# Patient Record
Sex: MALE | Race: BLACK OR AFRICAN AMERICAN | NOT HISPANIC OR LATINO | Employment: FULL TIME | URBAN - METROPOLITAN AREA
[De-identification: names, ages, dates, MRNs, and addresses within clinical notes are randomized per-mention and may not be internally consistent; named-entity substitution may affect disease eponyms.]

---

## 2023-04-24 ENCOUNTER — TELEPHONE (OUTPATIENT)
Dept: PRIMARY CARE | Facility: CLINIC | Age: 63
End: 2023-04-24
Payer: COMMERCIAL

## 2023-04-24 DIAGNOSIS — Z12.5 SCREENING PSA (PROSTATE SPECIFIC ANTIGEN): Primary | ICD-10-CM

## 2023-04-24 DIAGNOSIS — E78.5 HYPERLIPIDEMIA, UNSPECIFIED HYPERLIPIDEMIA TYPE: ICD-10-CM

## 2023-04-24 DIAGNOSIS — Z00.00 WELLNESS EXAMINATION: ICD-10-CM

## 2023-04-24 NOTE — TELEPHONE ENCOUNTER
----- Message from Vira Bailey sent at 4/24/2023  2:16 PM EDT -----  Regarding: request for labs  Patient called to request labs before his appointment on 05/01/23.

## 2023-04-25 ENCOUNTER — TELEPHONE (OUTPATIENT)
Dept: PRIMARY CARE | Facility: CLINIC | Age: 63
End: 2023-04-25

## 2023-04-25 NOTE — TELEPHONE ENCOUNTER
----- Message from Vicky Rivers DO sent at 4/24/2023  5:25 PM EDT -----  Regarding: RE: request for labs  Labs ordered  They are fasting  Let him know pols  ----- Message -----  From: Vira Bailey  Sent: 4/24/2023   2:18 PM EDT  To: Vicky Rivers DO  Subject: request for labs                                 Patient called to request labs before his appointment on 05/01/23.

## 2023-04-28 ENCOUNTER — LAB (OUTPATIENT)
Dept: LAB | Facility: LAB | Age: 63
End: 2023-04-28
Payer: COMMERCIAL

## 2023-04-28 DIAGNOSIS — E78.5 HYPERLIPIDEMIA, UNSPECIFIED HYPERLIPIDEMIA TYPE: ICD-10-CM

## 2023-04-28 DIAGNOSIS — Z00.00 WELLNESS EXAMINATION: ICD-10-CM

## 2023-04-28 DIAGNOSIS — Z12.5 SCREENING PSA (PROSTATE SPECIFIC ANTIGEN): ICD-10-CM

## 2023-04-28 LAB
ALANINE AMINOTRANSFERASE (SGPT) (U/L) IN SER/PLAS: 9 U/L (ref 10–52)
ALBUMIN (G/DL) IN SER/PLAS: 4.5 G/DL (ref 3.4–5)
ALKALINE PHOSPHATASE (U/L) IN SER/PLAS: 105 U/L (ref 33–136)
ANION GAP IN SER/PLAS: 11 MMOL/L (ref 10–20)
ASPARTATE AMINOTRANSFERASE (SGOT) (U/L) IN SER/PLAS: 18 U/L (ref 9–39)
BILIRUBIN TOTAL (MG/DL) IN SER/PLAS: 0.8 MG/DL (ref 0–1.2)
CALCIUM (MG/DL) IN SER/PLAS: 10 MG/DL (ref 8.6–10.6)
CARBON DIOXIDE, TOTAL (MMOL/L) IN SER/PLAS: 29 MMOL/L (ref 21–32)
CHLORIDE (MMOL/L) IN SER/PLAS: 107 MMOL/L (ref 98–107)
CHOLESTEROL (MG/DL) IN SER/PLAS: 222 MG/DL (ref 0–199)
CHOLESTEROL IN HDL (MG/DL) IN SER/PLAS: 54 MG/DL
CHOLESTEROL/HDL RATIO: 4.1
CREATININE (MG/DL) IN SER/PLAS: 1.1 MG/DL (ref 0.5–1.3)
GFR MALE: 77 ML/MIN/1.73M2
GLUCOSE (MG/DL) IN SER/PLAS: 86 MG/DL (ref 74–99)
HEPATITIS C VIRUS AB PRESENCE IN SERUM: NONREACTIVE
LDL: 152 MG/DL (ref 0–99)
POTASSIUM (MMOL/L) IN SER/PLAS: 5.1 MMOL/L (ref 3.5–5.3)
PROSTATE SPECIFIC AG (NG/ML) IN SER/PLAS: 0.8 NG/ML (ref 0–4)
PROTEIN TOTAL: 7.6 G/DL (ref 6.4–8.2)
SODIUM (MMOL/L) IN SER/PLAS: 142 MMOL/L (ref 136–145)
TRIGLYCERIDE (MG/DL) IN SER/PLAS: 78 MG/DL (ref 0–149)
UREA NITROGEN (MG/DL) IN SER/PLAS: 14 MG/DL (ref 6–23)
VLDL: 16 MG/DL (ref 0–40)

## 2023-04-28 PROCEDURE — 80053 COMPREHEN METABOLIC PANEL: CPT

## 2023-04-28 PROCEDURE — 86803 HEPATITIS C AB TEST: CPT

## 2023-04-28 PROCEDURE — 84153 ASSAY OF PSA TOTAL: CPT

## 2023-04-28 PROCEDURE — 80061 LIPID PANEL: CPT

## 2023-04-28 PROCEDURE — 36415 COLL VENOUS BLD VENIPUNCTURE: CPT

## 2023-05-01 ENCOUNTER — OFFICE VISIT (OUTPATIENT)
Dept: PRIMARY CARE | Facility: CLINIC | Age: 63
End: 2023-05-01
Payer: COMMERCIAL

## 2023-05-01 VITALS
WEIGHT: 217.8 LBS | OXYGEN SATURATION: 97 % | HEART RATE: 61 BPM | RESPIRATION RATE: 12 BRPM | TEMPERATURE: 97.4 F | HEIGHT: 74 IN | DIASTOLIC BLOOD PRESSURE: 80 MMHG | BODY MASS INDEX: 27.95 KG/M2 | SYSTOLIC BLOOD PRESSURE: 124 MMHG

## 2023-05-01 DIAGNOSIS — I25.10 CORONARY ARTERY DISEASE INVOLVING NATIVE HEART, UNSPECIFIED VESSEL OR LESION TYPE, UNSPECIFIED WHETHER ANGINA PRESENT: Primary | ICD-10-CM

## 2023-05-01 PROBLEM — E66.3 OVERWEIGHT WITH BODY MASS INDEX (BMI) 25.0-29.9: Status: ACTIVE | Noted: 2023-01-27

## 2023-05-01 PROBLEM — F32.A DEPRESSIVE DISORDER: Status: ACTIVE | Noted: 2023-05-01

## 2023-05-01 PROBLEM — S82.009A CLOSED FRACTURE OF PATELLA: Status: ACTIVE | Noted: 2023-05-01

## 2023-05-01 PROBLEM — A60.00 GENITAL HERPES SIMPLEX: Status: ACTIVE | Noted: 2023-05-01

## 2023-05-01 PROBLEM — M19.91 LOCALIZED, PRIMARY OSTEOARTHRITIS: Status: ACTIVE | Noted: 2023-01-27

## 2023-05-01 PROBLEM — E78.5 HYPERLIPEMIA: Status: ACTIVE | Noted: 2023-05-01

## 2023-05-01 PROBLEM — M79.671 RIGHT FOOT PAIN: Status: ACTIVE | Noted: 2023-05-01

## 2023-05-01 PROBLEM — H18.519 CORNEA GUTTATA: Status: ACTIVE | Noted: 2023-05-01

## 2023-05-01 PROBLEM — M21.6X1 PRONATION OF BOTH FEET: Status: ACTIVE | Noted: 2023-05-01

## 2023-05-01 PROBLEM — I05.9 MITRAL VALVE DISORDER: Status: ACTIVE | Noted: 2023-05-01

## 2023-05-01 PROBLEM — D12.6 TUBULAR ADENOMA OF COLON: Status: ACTIVE | Noted: 2023-05-01

## 2023-05-01 PROBLEM — R22.2 MASS IN CHEST: Status: ACTIVE | Noted: 2023-05-01

## 2023-05-01 PROBLEM — G25.81 RESTLESS LEG: Status: ACTIVE | Noted: 2023-05-01

## 2023-05-01 PROBLEM — M40.204 KYPHOSIS OF THORACIC REGION: Status: ACTIVE | Noted: 2023-05-01

## 2023-05-01 PROBLEM — C38.3: Status: RESOLVED | Noted: 2023-05-01 | Resolved: 2023-05-01

## 2023-05-01 PROBLEM — S83.004A CLOSED DISLOCATION OF RIGHT PATELLA: Status: ACTIVE | Noted: 2023-05-01

## 2023-05-01 PROBLEM — R22.1 MASS IN NECK: Status: ACTIVE | Noted: 2023-05-01

## 2023-05-01 PROBLEM — H25.813 COMBINED FORM OF AGE-RELATED CATARACT, BOTH EYES: Status: ACTIVE | Noted: 2023-05-01

## 2023-05-01 PROBLEM — C38.3: Status: ACTIVE | Noted: 2023-05-01

## 2023-05-01 PROBLEM — N52.9 ERECTILE DYSFUNCTION: Status: ACTIVE | Noted: 2023-05-01

## 2023-05-01 PROBLEM — M21.6X2 PRONATION OF BOTH FEET: Status: ACTIVE | Noted: 2023-05-01

## 2023-05-01 PROBLEM — R31.29 MICROSCOPIC HEMATURIA: Status: ACTIVE | Noted: 2023-05-01

## 2023-05-01 PROBLEM — H33.322 RETINAL HOLE OF LEFT EYE: Status: ACTIVE | Noted: 2023-05-01

## 2023-05-01 PROBLEM — N13.8 BENIGN PROSTATIC HYPERPLASIA WITH URINARY OBSTRUCTION: Status: ACTIVE | Noted: 2023-05-01

## 2023-05-01 PROBLEM — M89.9 BONE DISORDER: Status: ACTIVE | Noted: 2023-05-01

## 2023-05-01 PROBLEM — D49.89 MEDIASTINAL TUMOR: Status: ACTIVE | Noted: 2023-05-01

## 2023-05-01 PROBLEM — K21.9 GASTRO-ESOPHAGEAL REFLUX DISEASE WITHOUT ESOPHAGITIS: Status: ACTIVE | Noted: 2023-05-01

## 2023-05-01 PROBLEM — H25.11 AGE-RELATED NUCLEAR CATARACT, RIGHT: Status: ACTIVE | Noted: 2023-05-01

## 2023-05-01 PROBLEM — H52.4 PRESBYOPIA: Status: ACTIVE | Noted: 2023-05-01

## 2023-05-01 PROBLEM — N13.8 BPH WITH OBSTRUCTION/LOWER URINARY TRACT SYMPTOMS: Status: ACTIVE | Noted: 2023-05-01

## 2023-05-01 PROBLEM — S22.000A THORACIC COMPRESSION FRACTURE (MULTI): Status: ACTIVE | Noted: 2023-05-01

## 2023-05-01 PROBLEM — I25.9 ASYMPTOMATIC CORONARY HEART DISEASE: Status: ACTIVE | Noted: 2023-05-01

## 2023-05-01 PROBLEM — M54.50 LOW BACK PAIN: Status: ACTIVE | Noted: 2023-05-01

## 2023-05-01 PROBLEM — H25.12 AGE-RELATED NUCLEAR CATARACT, LEFT: Status: ACTIVE | Noted: 2023-05-01

## 2023-05-01 PROBLEM — E78.5 HYPERLIPIDEMIA: Status: ACTIVE | Noted: 2023-05-01

## 2023-05-01 PROBLEM — M79.671 PAIN OF RIGHT HEEL: Status: ACTIVE | Noted: 2023-05-01

## 2023-05-01 PROBLEM — M17.11 ARTHRITIS OF RIGHT KNEE: Status: ACTIVE | Noted: 2023-05-01

## 2023-05-01 PROBLEM — I34.0 MITRAL VALVE INSUFFICIENCY: Status: ACTIVE | Noted: 2023-05-01

## 2023-05-01 PROBLEM — N40.1 BPH WITH OBSTRUCTION/LOWER URINARY TRACT SYMPTOMS: Status: ACTIVE | Noted: 2023-05-01

## 2023-05-01 PROBLEM — M67.01 SHORT ACHILLES TENDON OF RIGHT LOWER EXTREMITY: Status: ACTIVE | Noted: 2023-05-01

## 2023-05-01 PROBLEM — R45.4 ANGER: Status: ACTIVE | Noted: 2023-05-01

## 2023-05-01 PROBLEM — H35.412 LATTICE DEGENERATION OF PERIPHERAL RETINA, LEFT: Status: ACTIVE | Noted: 2023-05-01

## 2023-05-01 PROBLEM — M72.2 PLANTAR FASCIITIS: Status: ACTIVE | Noted: 2023-05-01

## 2023-05-01 PROBLEM — N40.1 BENIGN PROSTATIC HYPERPLASIA WITH URINARY OBSTRUCTION: Status: ACTIVE | Noted: 2023-05-01

## 2023-05-01 PROBLEM — R59.0 MEDIASTINAL ADENOPATHY: Status: ACTIVE | Noted: 2023-05-01

## 2023-05-01 PROCEDURE — 1036F TOBACCO NON-USER: CPT | Performed by: FAMILY MEDICINE

## 2023-05-01 PROCEDURE — 99213 OFFICE O/P EST LOW 20 MIN: CPT | Performed by: FAMILY MEDICINE

## 2023-05-01 RX ORDER — ATORVASTATIN CALCIUM 40 MG/1
40 TABLET, FILM COATED ORAL DAILY
Qty: 90 TABLET | Refills: 0 | Status: SHIPPED | OUTPATIENT
Start: 2023-05-01 | End: 2024-03-20 | Stop reason: SDUPTHER

## 2023-05-01 RX ORDER — ASPIRIN 81 MG/1
TABLET ORAL
COMMUNITY
Start: 2022-04-15

## 2023-05-01 RX ORDER — SILDENAFIL CITRATE 20 MG/1
TABLET ORAL
COMMUNITY
Start: 2020-09-02 | End: 2024-03-20 | Stop reason: WASHOUT

## 2023-05-01 RX ORDER — ATORVASTATIN CALCIUM 20 MG/1
1 TABLET, FILM COATED ORAL DAILY
COMMUNITY
End: 2023-05-01 | Stop reason: SDUPTHER

## 2023-05-01 RX ORDER — MULTIVIT-MIN/IRON/FOLIC ACID/K 18-600-40
CAPSULE ORAL
COMMUNITY

## 2023-05-01 ASSESSMENT — LIFESTYLE VARIABLES
SKIP TO QUESTIONS 9-10: 1
HOW OFTEN DO YOU HAVE A DRINK CONTAINING ALCOHOL: NEVER
HOW MANY STANDARD DRINKS CONTAINING ALCOHOL DO YOU HAVE ON A TYPICAL DAY: PATIENT DOES NOT DRINK
HOW OFTEN DO YOU HAVE SIX OR MORE DRINKS ON ONE OCCASION: NEVER
AUDIT-C TOTAL SCORE: 0

## 2023-05-01 NOTE — PATIENT INSTRUCTIONS
Start on higher dose of the atorvastatin 40mg  - repeat labs in 3 months  -referral for cardio for clearance    Please follow up in 6months for wellness or as needed.       ** If labs or imaging ordered at today's visit, all the non-urgent results will be discussed at your next visit    If you have been referred for a special test or to a specialist please call  [phone] to schedule an appointment.  If you have any further questions, or if develop new or worsened symptoms, please give our office a call at [phone].

## 2023-05-01 NOTE — PROGRESS NOTES
"Subjective   Patient ID: Rob Quinonez is a 62 y.o. male who presents for Follow-up (6 month ).    HPI       BP at goal today in office.  Using medications without issues.  Denies CP, SOB, palpitations, change in vision, dizziness, N/V.      Review of Systems    All systems reviewed and neg if not noted in the HPI above     Objective   /80   Pulse 61   Temp 36.3 °C (97.4 °F)   Resp 12   Ht 1.88 m (6' 2\")   Wt 98.8 kg (217 lb 12.8 oz)   SpO2 97%   BMI 27.96 kg/m²     Physical Exam    Pleasant  Eyes: conjunctiva non-icteric and eye lids are without obvious rash or drooping. Pupils are symmetric.   Ears, Nose, Mouth, and Throat: External ears and nose appear to be without deformity or rash. No lesions or masses noted. Hearing is grossly intact.   CV: RRR, no murmur  Carotids: no bruits  Pulm:CTA B/L  Abd: soft, NTTP, + BS  LE: no edema  Psychiatric: Alert, orientation to person, place, and time. Recent/remote memory as evidenced through face-to-face interaction and discussion appear grossly intact. Mood and affect are normal.     Assessment/Plan   Problem List Items Addressed This Visit          Circulatory    CAD (coronary artery disease) - Primary    Relevant Medications    sildenafil (Revatio) 20 mg tablet    atorvastatin (Lipitor) 40 mg tablet    Other Relevant Orders    AST    ALT    Lipid Panel    Referral to Cardiology          "

## 2023-05-31 LAB
ANION GAP IN SER/PLAS: 13 MMOL/L (ref 10–20)
BASOPHILS (10*3/UL) IN BLOOD BY AUTOMATED COUNT: 0.05 X10E9/L (ref 0–0.1)
BASOPHILS/100 LEUKOCYTES IN BLOOD BY AUTOMATED COUNT: 0.8 % (ref 0–2)
CALCIUM (MG/DL) IN SER/PLAS: 9.8 MG/DL (ref 8.6–10.3)
CARBON DIOXIDE, TOTAL (MMOL/L) IN SER/PLAS: 28 MMOL/L (ref 21–32)
CHLORIDE (MMOL/L) IN SER/PLAS: 105 MMOL/L (ref 98–107)
CREATININE (MG/DL) IN SER/PLAS: 0.94 MG/DL (ref 0.5–1.3)
EOSINOPHILS (10*3/UL) IN BLOOD BY AUTOMATED COUNT: 0.12 X10E9/L (ref 0–0.7)
EOSINOPHILS/100 LEUKOCYTES IN BLOOD BY AUTOMATED COUNT: 1.9 % (ref 0–6)
ERYTHROCYTE DISTRIBUTION WIDTH (RATIO) BY AUTOMATED COUNT: 14.4 % (ref 11.5–14.5)
ERYTHROCYTE MEAN CORPUSCULAR HEMOGLOBIN CONCENTRATION (G/DL) BY AUTOMATED: 32.6 G/DL (ref 32–36)
ERYTHROCYTE MEAN CORPUSCULAR VOLUME (FL) BY AUTOMATED COUNT: 91 FL (ref 80–100)
ERYTHROCYTES (10*6/UL) IN BLOOD BY AUTOMATED COUNT: 4.94 X10E12/L (ref 4.5–5.9)
GFR MALE: >90 ML/MIN/1.73M2
GLUCOSE (MG/DL) IN SER/PLAS: 73 MG/DL (ref 74–99)
HEMATOCRIT (%) IN BLOOD BY AUTOMATED COUNT: 44.8 % (ref 41–52)
HEMOGLOBIN (G/DL) IN BLOOD: 14.6 G/DL (ref 13.5–17.5)
IMMATURE GRANULOCYTES/100 LEUKOCYTES IN BLOOD BY AUTOMATED COUNT: 0.3 % (ref 0–0.9)
LEUKOCYTES (10*3/UL) IN BLOOD BY AUTOMATED COUNT: 6.3 X10E9/L (ref 4.4–11.3)
LYMPHOCYTES (10*3/UL) IN BLOOD BY AUTOMATED COUNT: 2.44 X10E9/L (ref 1.2–4.8)
LYMPHOCYTES/100 LEUKOCYTES IN BLOOD BY AUTOMATED COUNT: 39 % (ref 13–44)
MONOCYTES (10*3/UL) IN BLOOD BY AUTOMATED COUNT: 0.52 X10E9/L (ref 0.1–1)
MONOCYTES/100 LEUKOCYTES IN BLOOD BY AUTOMATED COUNT: 8.3 % (ref 2–10)
NEUTROPHILS (10*3/UL) IN BLOOD BY AUTOMATED COUNT: 3.1 X10E9/L (ref 1.2–7.7)
NEUTROPHILS/100 LEUKOCYTES IN BLOOD BY AUTOMATED COUNT: 49.7 % (ref 40–80)
PLATELETS (10*3/UL) IN BLOOD AUTOMATED COUNT: 204 X10E9/L (ref 150–450)
POTASSIUM (MMOL/L) IN SER/PLAS: 4.6 MMOL/L (ref 3.5–5.3)
SODIUM (MMOL/L) IN SER/PLAS: 141 MMOL/L (ref 136–145)
UREA NITROGEN (MG/DL) IN SER/PLAS: 17 MG/DL (ref 6–23)

## 2023-06-02 LAB — STAPH/MRSA SCREEN, CULTURE: NORMAL

## 2023-06-14 ENCOUNTER — HOSPITAL ENCOUNTER (OUTPATIENT)
Dept: DATA CONVERSION | Facility: HOSPITAL | Age: 63
End: 2023-06-14
Attending: ORTHOPAEDIC SURGERY | Admitting: ORTHOPAEDIC SURGERY
Payer: COMMERCIAL

## 2023-06-14 DIAGNOSIS — M17.11 UNILATERAL PRIMARY OSTEOARTHRITIS, RIGHT KNEE: ICD-10-CM

## 2023-06-14 LAB — SARS-COV-2 RESULT: NOT DETECTED

## 2023-06-15 LAB
ANION GAP IN SER/PLAS: NORMAL
BASOPHILS (10*3/UL) IN BLOOD BY AUTOMATED COUNT: NORMAL
BASOPHILS/100 LEUKOCYTES IN BLOOD BY AUTOMATED COUNT: NORMAL
CALCIUM (MG/DL) IN SER/PLAS: NORMAL
CARBON DIOXIDE, TOTAL (MMOL/L) IN SER/PLAS: NORMAL
CHLORIDE (MMOL/L) IN SER/PLAS: NORMAL
CREATININE (MG/DL) IN SER/PLAS: NORMAL
EOSINOPHILS (10*3/UL) IN BLOOD BY AUTOMATED COUNT: NORMAL
EOSINOPHILS/100 LEUKOCYTES IN BLOOD BY AUTOMATED COUNT: NORMAL
ERYTHROCYTE DISTRIBUTION WIDTH (RATIO) BY AUTOMATED COUNT: NORMAL
ERYTHROCYTE MEAN CORPUSCULAR HEMOGLOBIN CONCENTRATION (G/DL) BY AUTOMATED: NORMAL
ERYTHROCYTE MEAN CORPUSCULAR VOLUME (FL) BY AUTOMATED COUNT: NORMAL
ERYTHROCYTES (10*6/UL) IN BLOOD BY AUTOMATED COUNT: NORMAL
GFR FEMALE: NORMAL
GFR MALE: NORMAL
GLUCOSE (MG/DL) IN SER/PLAS: NORMAL
HEMATOCRIT (%) IN BLOOD BY AUTOMATED COUNT: NORMAL
HEMOGLOBIN (G/DL) IN BLOOD: NORMAL
IMMATURE GRANULOCYTES/100 LEUKOCYTES IN BLOOD BY AUTOMATED COUNT: NORMAL
LEUKOCYTES (10*3/UL) IN BLOOD BY AUTOMATED COUNT: NORMAL
LYMPHOCYTES (10*3/UL) IN BLOOD BY AUTOMATED COUNT: NORMAL
LYMPHOCYTES/100 LEUKOCYTES IN BLOOD BY AUTOMATED COUNT: NORMAL
MANUAL DIFFERENTIAL Y/N: NORMAL
MONOCYTES (10*3/UL) IN BLOOD BY AUTOMATED COUNT: NORMAL
MONOCYTES/100 LEUKOCYTES IN BLOOD BY AUTOMATED COUNT: NORMAL
NEUTROPHILS (10*3/UL) IN BLOOD BY AUTOMATED COUNT: NORMAL
NEUTROPHILS/100 LEUKOCYTES IN BLOOD BY AUTOMATED COUNT: NORMAL
NRBC (PER 100 WBCS) BY AUTOMATED COUNT: NORMAL
PLATELETS (10*3/UL) IN BLOOD AUTOMATED COUNT: NORMAL
POTASSIUM (MMOL/L) IN SER/PLAS: NORMAL
SODIUM (MMOL/L) IN SER/PLAS: NORMAL
UREA NITROGEN (MG/DL) IN SER/PLAS: NORMAL

## 2023-09-07 VITALS
DIASTOLIC BLOOD PRESSURE: 96 MMHG | OXYGEN SATURATION: 98 % | HEIGHT: 74 IN | SYSTOLIC BLOOD PRESSURE: 165 MMHG | BODY MASS INDEX: 27.05 KG/M2 | HEART RATE: 59 BPM | WEIGHT: 210.76 LBS

## 2023-09-30 NOTE — H&P
History & Physical Reviewed:   I have reviewed the History and Physical dated:  31-May-2023   History and Physical reviewed and relevant findings noted. Patient examined to review pertinent physical  findings.: No significant changes   Home Medications Reviewed: no changes noted   Allergies Reviewed: no changes noted       ERAS (Enhanced Recovery After Surgery):  ·  ERAS Patient: no     Consent:   COVID-19 Consent:  ·  COVID-19 Risk Consent Surgeon has reviewed key risks related to the risk of reza COVID-19 and if they contract COVID-19 what the risks are.       Electronic Signatures:  Rob Bustillo)  (Signed 14-Jun-2023 07:31)   Authored: History & Physical Reviewed, ERAS, Consent,  Note Completion      Last Updated: 14-Jun-2023 07:31 by Rob Bustillo)

## 2023-10-02 NOTE — OP NOTE
Post Operative Note:     PreOp Diagnosis: djd right knee   Post-Procedure Diagnosis: same   Procedure: 1. right total knee arthroplasty  2.   3.   4.   5.   Surgeon: Keny   Resident/Fellow/Other Assistant: Ken/Brayden   Estimated Blood Loss (mL): none   Specimen: yes   Findings: djd       Electronic Signatures:  Rob Bustillo)  (Signed 14-Jun-2023 09:33)   Authored: Post Operative Note, Note Completion      Last Updated: 14-Jun-2023 09:33 by Rob Bustillo)

## 2023-12-08 ENCOUNTER — APPOINTMENT (OUTPATIENT)
Dept: PRIMARY CARE | Facility: CLINIC | Age: 63
End: 2023-12-08
Payer: COMMERCIAL

## 2024-01-31 DIAGNOSIS — R91.1 LUNG NODULE: Primary | ICD-10-CM

## 2024-03-04 ENCOUNTER — TELEPHONE (OUTPATIENT)
Dept: PRIMARY CARE | Facility: CLINIC | Age: 64
End: 2024-03-04
Payer: COMMERCIAL

## 2024-03-04 NOTE — TELEPHONE ENCOUNTER
Anuj would like to know if Dr. Rivers plans on ordering labs for his 3/20 physical, if so can he get them prior to his appointment?

## 2024-03-14 ENCOUNTER — LAB (OUTPATIENT)
Dept: LAB | Facility: LAB | Age: 64
End: 2024-03-14
Payer: COMMERCIAL

## 2024-03-14 DIAGNOSIS — Z01.84 IMMUNITY STATUS TESTING: ICD-10-CM

## 2024-03-14 DIAGNOSIS — I25.10 CORONARY ARTERY DISEASE INVOLVING NATIVE HEART, UNSPECIFIED VESSEL OR LESION TYPE, UNSPECIFIED WHETHER ANGINA PRESENT: ICD-10-CM

## 2024-03-14 LAB
ALT SERPL W P-5'-P-CCNC: 15 U/L (ref 10–52)
AST SERPL W P-5'-P-CCNC: 23 U/L (ref 9–39)
CHOLEST SERPL-MCNC: 134 MG/DL (ref 0–199)
CHOLESTEROL/HDL RATIO: 2.5
HDLC SERPL-MCNC: 52.6 MG/DL
LDLC SERPL CALC-MCNC: 70 MG/DL
NON HDL CHOLESTEROL: 81 MG/DL (ref 0–149)
TRIGL SERPL-MCNC: 55 MG/DL (ref 0–149)
VLDL: 11 MG/DL (ref 0–40)

## 2024-03-14 PROCEDURE — 36415 COLL VENOUS BLD VENIPUNCTURE: CPT

## 2024-03-14 PROCEDURE — 84450 TRANSFERASE (AST) (SGOT): CPT

## 2024-03-14 PROCEDURE — 84460 ALANINE AMINO (ALT) (SGPT): CPT

## 2024-03-14 PROCEDURE — 86787 VARICELLA-ZOSTER ANTIBODY: CPT

## 2024-03-14 PROCEDURE — 80061 LIPID PANEL: CPT

## 2024-03-19 PROBLEM — K31.9 STOMACH PROBLEMS: Status: ACTIVE | Noted: 2024-03-19

## 2024-03-19 PROBLEM — Z86.19 HISTORY OF VIRAL INFECTION: Status: ACTIVE | Noted: 2024-03-19

## 2024-03-19 PROBLEM — I45.10 RIGHT BUNDLE BRANCH BLOCK (RBBB) ON ELECTROCARDIOGRAM (ECG): Status: ACTIVE | Noted: 2024-03-19

## 2024-03-19 PROBLEM — Z96.651 STATUS POST TOTAL RIGHT KNEE REPLACEMENT: Status: ACTIVE | Noted: 2024-03-19

## 2024-03-19 PROBLEM — M25.569 KNEE PAIN: Status: ACTIVE | Noted: 2024-03-19

## 2024-03-20 ENCOUNTER — OFFICE VISIT (OUTPATIENT)
Dept: PRIMARY CARE | Facility: CLINIC | Age: 64
End: 2024-03-20
Payer: COMMERCIAL

## 2024-03-20 VITALS
HEART RATE: 78 BPM | WEIGHT: 209.1 LBS | TEMPERATURE: 98.1 F | HEIGHT: 74 IN | RESPIRATION RATE: 15 BRPM | OXYGEN SATURATION: 98 % | DIASTOLIC BLOOD PRESSURE: 74 MMHG | SYSTOLIC BLOOD PRESSURE: 128 MMHG | BODY MASS INDEX: 26.84 KG/M2

## 2024-03-20 DIAGNOSIS — Z01.84 IMMUNITY STATUS TESTING: ICD-10-CM

## 2024-03-20 DIAGNOSIS — I25.10 CORONARY ARTERY DISEASE INVOLVING NATIVE HEART, UNSPECIFIED VESSEL OR LESION TYPE, UNSPECIFIED WHETHER ANGINA PRESENT: ICD-10-CM

## 2024-03-20 DIAGNOSIS — Z00.00 WELLNESS EXAMINATION: Primary | ICD-10-CM

## 2024-03-20 DIAGNOSIS — K21.9 GASTRO-ESOPHAGEAL REFLUX DISEASE WITHOUT ESOPHAGITIS: ICD-10-CM

## 2024-03-20 DIAGNOSIS — E78.5 HYPERLIPIDEMIA, UNSPECIFIED HYPERLIPIDEMIA TYPE: ICD-10-CM

## 2024-03-20 PROBLEM — S22.000A THORACIC COMPRESSION FRACTURE (MULTI): Status: RESOLVED | Noted: 2023-05-01 | Resolved: 2024-03-20

## 2024-03-20 LAB
VARICELLA ZOSTER IGG INDEX: 6 IA
VZV IGG SER QL IA: POSITIVE

## 2024-03-20 PROCEDURE — 1036F TOBACCO NON-USER: CPT | Performed by: FAMILY MEDICINE

## 2024-03-20 PROCEDURE — 99396 PREV VISIT EST AGE 40-64: CPT | Performed by: FAMILY MEDICINE

## 2024-03-20 RX ORDER — FAMOTIDINE 20 MG/1
20 TABLET, FILM COATED ORAL 2 TIMES DAILY
COMMUNITY
End: 2024-03-20 | Stop reason: SDUPTHER

## 2024-03-20 RX ORDER — SILDENAFIL 50 MG/1
TABLET, FILM COATED ORAL
COMMUNITY
Start: 2024-03-12

## 2024-03-20 RX ORDER — FAMOTIDINE 40 MG/1
40 TABLET, FILM COATED ORAL DAILY
Qty: 90 TABLET | Refills: 0 | Status: SHIPPED | OUTPATIENT
Start: 2024-03-20

## 2024-03-20 RX ORDER — ATORVASTATIN CALCIUM 40 MG/1
40 TABLET, FILM COATED ORAL DAILY
Qty: 90 TABLET | Refills: 3 | Status: SHIPPED | OUTPATIENT
Start: 2024-03-20

## 2024-03-20 ASSESSMENT — PATIENT HEALTH QUESTIONNAIRE - PHQ9
SUM OF ALL RESPONSES TO PHQ9 QUESTIONS 1 AND 2: 0
1. LITTLE INTEREST OR PLEASURE IN DOING THINGS: NOT AT ALL
2. FEELING DOWN, DEPRESSED OR HOPELESS: NOT AT ALL

## 2024-03-20 NOTE — PATIENT INSTRUCTIONS
HTN  - Your blood pressure is at goal today- goal is less than 130/80  - Continue your current medications  - Weight loss can help lower your bp!  Work on a healthy whole food diet and add at least 30min of exercise 5 days per week  - Work on a low salt diet      Continue to work on healthy diet and exercise  We encourage all patients to engage in exercise 150 minutes per week   Adults 18 and older, activity during each week - if you are able:    Engage in at least 150 minutes of moderate or vigorous exercise per week   If you are able- Engage in muscle strengthening activity on 2 or more days per week    Discussed avoiding marijuana       To get labs 1-2wks to 1 day prior to our next appt      Please follow up in  1 yr for wellness and 6 months for HTN or as needed.       ** If labs or imaging ordered at today's visit, all the non-urgent results will be discussed at your next visit    If you have been referred for a special test or to a specialist please call  [phone] to schedule an appointment.  If you have any further questions, or if develop new or worsened symptoms, please give our office a call at [phone].

## 2024-03-20 NOTE — PROGRESS NOTES
"Subjective   Patient ID: Rob Quinonez is a 63 y.o. male who presents for Annual Exam.    HPI     Here for a physical  Does not want a chaperone.     Other concerns/issues/followup:      1 -doing well other wise       [facility] was reviewed & updated.        ---Social---  Job: [employer]- retiring July 1, was in [employer]  : girl friend (was  2 X)  Kids:3 (6grands)  Likes: fishing, hunting, drag race, music, bowling, football  No foreign travel plans      ETOH - Occ  Drugs - Marijuana -daily (helps with his appetite)  Tobacco - quit- 8yrs ago     Review of Systems  All systems reviewed and neg if not noted in the HPI above      Objective   /74 (Patient Position: Sitting)   Pulse 78   Temp 36.7 °C (98.1 °F)   Resp 15   Ht 1.88 m (6' 2\")   Wt 94.8 kg (209 lb 1.6 oz)   SpO2 98%   BMI 26.85 kg/m²     Physical Exam  Vitals reviewed.   Constitutional:       Appearance: Normal appearance.   HENT:      Head: Normocephalic.   Eyes:      Extraocular Movements: Extraocular movements intact.   Cardiovascular:      Rate and Rhythm: Normal rate and regular rhythm.      Heart sounds: Normal heart sounds. No murmur heard.  Pulmonary:      Effort: Pulmonary effort is normal. No respiratory distress.      Breath sounds: Normal breath sounds. No wheezing.   Abdominal:      General: Bowel sounds are normal. There is no distension.      Palpations: Abdomen is soft.   Skin:     General: Skin is warm and dry.   Neurological:      General: No focal deficit present.      Mental Status: He is alert and oriented to person, place, and time.   Psychiatric:         Mood and Affect: Mood normal.         Behavior: Behavior normal.         Thought Content: Thought content normal.         Judgment: Judgment normal.           Assessment/Plan   Problem List Items Addressed This Visit             ICD-10-CM    CAD (coronary artery disease) I25.10    Relevant Medications    sildenafil (Viagra) 50 mg tablet    atorvastatin (Lipitor) 40 " mg tablet    Other Relevant Orders    CBC and Auto Differential    Comprehensive Metabolic Panel    Lipid Panel    TSH with reflex to Free T4 if abnormal    Gastro-esophageal reflux disease without esophagitis K21.9    Relevant Medications    famotidine (Pepcid) 40 mg tablet    Hyperlipidemia E78.5    Relevant Medications    atorvastatin (Lipitor) 40 mg tablet    Other Relevant Orders    CBC and Auto Differential    Comprehensive Metabolic Panel    Lipid Panel    TSH with reflex to Free T4 if abnormal     Other Visit Diagnoses         Codes    Wellness examination    -  Primary  To get labs 1-2wks to 1 day prior to our next appt  Continue to work on healthy diet and exercise  We encourage all patients to engage in exercise 150 minutes per week   Adults 18 and older, activity during each week - if you are able:    Engage in at least 150 minutes of moderate or vigorous exercise per week   If you are able- Engage in muscle strengthening activity on 2 or more days per week   Z00.00    Relevant Orders    CBC and Auto Differential    Comprehensive Metabolic Panel    Lipid Panel    TSH with reflex to Free T4 if abnormal    Immunity status testing     Z01.84    Relevant Orders    Varicella zoster antibody, IgG              Please follow up in  1 yr for wellness and 6 months for HTN or as needed.

## 2024-05-06 NOTE — OP NOTE
PREOPERATIVE DIAGNOSIS:  Degenerative arthritis, right knee.    POSTOPERATIVE DIAGNOSIS:  Degenerative arthritis, right knee.    OPERATION/PROCEDURE:  Right total knee arthroplasty.    SURGEON:  Rob Bustillo MD.    ASSISTANT(S):  Ashu.    ANESTHESIA:  Spinal.    COMPLICATIONS:  None.    EQUIPMENT USED:  DePuy Attune posterior stabilized knee, all components cemented,  fixed bearing.     DESCRIPTION OF OPERATION:  A preoperative Huddle was performed with patient identification,  procedure, and site verification.  The patient was given prophylactic  antibiotics and tranexamic acid, placed under anesthetic.  The right  leg was sterilely prepped and draped.  ChloraPrep solution was  utilized.  A Betadine impregnated drape was used as well.  Thigh  tourniquet was inflated after exsanguination with an Esmarch bandage.   Standard midline median parapatellar retinacular approach was made.  The proximal tibia and distal femur were exposed.  Fat pad was  excised.  The femur was cut using intramedullary guide system with a  10 mm distal femur cut.  The tibia was cut using the extramedullary  guide.  The patella was cut freehand.  Ultimately, a size 8 DePuy  Attune posterior stabilized cemented component was utilized and a  size 7 tibial base plate with a 5 mm posterior stabilized  fixed-bearing plastic and a 41 mm patellar button.  The components  were balanced in flexion and extension and the patella tracked  nicely.  Prior to closure, the wound was vigorously irrigated and  infiltrated with Marcaine and Duramorph.  The wound was closed in  layers.  Skin was closed using running subcuticular suture with a  Dermabond dressing.  The patient was taken to Recovery, having  tolerated procedure well.       Rob Bustillo MD    DD:  06/14/2023 09:36:53 EST  DT:  06/14/2023 11:03:33 EST  DICTATION NUMBER:  [id number]  INTERNAL JOB NUMBER:  [id number]    CC:  Rob Bustillo MD        Electronic  Signatures:  Rob Bustillo (MD) (Signed on 22-Jun-2023 10:20)   Authored  Unsigned, Draft (SYS GENERATED) (Entered on 14-Jun-2023 11:03)   Entered    Last Updated: 22-Jun-2023 10:20 by Rob Bustillo)

## 2024-06-10 ENCOUNTER — OFFICE VISIT (OUTPATIENT)
Dept: ORTHOPEDIC SURGERY | Facility: CLINIC | Age: 64
End: 2024-06-10
Payer: COMMERCIAL

## 2024-06-10 VITALS — WEIGHT: 209 LBS | BODY MASS INDEX: 26.82 KG/M2 | HEIGHT: 74 IN

## 2024-06-10 DIAGNOSIS — M17.11 ARTHRITIS OF RIGHT KNEE: Primary | ICD-10-CM

## 2024-06-10 PROCEDURE — 1036F TOBACCO NON-USER: CPT | Performed by: ORTHOPAEDIC SURGERY

## 2024-06-10 PROCEDURE — 99213 OFFICE O/P EST LOW 20 MIN: CPT | Performed by: ORTHOPAEDIC SURGERY

## 2024-06-10 ASSESSMENT — PAIN - FUNCTIONAL ASSESSMENT: PAIN_FUNCTIONAL_ASSESSMENT: NO/DENIES PAIN

## 2024-06-11 NOTE — PROGRESS NOTES
Follow-up right knee total arthroplasty  Doing well has no real complaints  No change interval medical history  Constitutional: Well-developed well-nourished   Eyes: Sclerae anicteric, pupils equal and round  HENT: Normocephalic atraumatic  Cardiovascular: Pulses full, regular rate and rhythm  Respiratory: Breathing not labored, no wheezing  Integumentary: Skin intact, no lesions or rashes  Neurological: Sensation intact, no gross strength deficits, reflexes equal  Psychiatric: Alert oriented and appropriate  Hematologic/lymphatic: No lymphadenopathy  Right knee: Healed surgical incision lacks a couple degrees of extension no effusion excellent flexion no instability no low-grade tenderness  X-rays show implant in good position assessment status post knee arthroplasty reviewed infection precautions expectations follow-up as needed

## 2024-07-08 NOTE — PROGRESS NOTES
Subjective   Rob Quinonez  is a 63 y.o. male who presents for evaluation of mediastinal mass.      He had a CT cardiac scoring that picked up an anterior mediastinal mass. This led to a CT Chest and finally a PET scan. The anterior mediastinal mass was not FDG-avid on PET, but he did have uptake in both tonsils and bilateral cervical lymph nodes. He underwent biopsies of these cervical nodes which were not malignant. I recommended radiographic surveillance as the PET suggested that the mass was likely benign.      Currently the patient is in their usual state of health and reporting symptoms of chronic dry cough. He denies the following symptoms: chest pain, shortness of breath at rest, shortness of breath with activity, hemoptysis, fevers, chills, and weight loss.  Swallowing normally    There have been no significant changes to their documented medical, surgical and family history.   He has no history of prior cancer.  He has no first degree relatives with lung cancer.     He  reports that he has never smoked. He has never used smokeless tobacco. He reports current drug use. Drug: Marijuana. He reports that he does not drink alcohol.    Objective   Physical Exam  The patient is well-appearing and in no acute distress. The trachea is midline and there is no crepitus. The lungs were clear to auscultation grossly. There was good effort and excursion. The heart had a regular rate and rhythm. The abdomen was soft, nontender and nondistended. The extremities had no edema or gross deformities. Mood and affect are appropriate.  Diagnostic Studies  I reviewed a CT chest performed recently. I do not see any change to the mediatinal mass  Assessment/Plan   Overall, I believe that the patient is doing well.     Based on the patient's clinical presentation and my review of their radiographic imaging, I believe the mediastinal mass is unlikely to represent a malignant process.  We discussed various management strategies  including surgery, biopsy, and observation.  Based on this discussion, the patient elected for observational management.  I recommend serial radiographic surveillance.    I recommend CT chest 12 months    I discussed this in detail with the patient, including a discussion of alternatives. They were comfortable with this approach.     Dalton Castaneda MD  [phone]

## 2024-07-11 ENCOUNTER — OFFICE VISIT (OUTPATIENT)
Dept: SURGERY | Facility: HOSPITAL | Age: 64
End: 2024-07-11
Payer: COMMERCIAL

## 2024-07-11 ENCOUNTER — HOSPITAL ENCOUNTER (OUTPATIENT)
Dept: RADIOLOGY | Facility: HOSPITAL | Age: 64
Discharge: HOME | End: 2024-07-11
Payer: COMMERCIAL

## 2024-07-11 VITALS
HEART RATE: 50 BPM | TEMPERATURE: 97 F | DIASTOLIC BLOOD PRESSURE: 78 MMHG | OXYGEN SATURATION: 100 % | BODY MASS INDEX: 25.78 KG/M2 | RESPIRATION RATE: 14 BRPM | SYSTOLIC BLOOD PRESSURE: 133 MMHG | WEIGHT: 200.8 LBS

## 2024-07-11 DIAGNOSIS — D49.89 MEDIASTINAL TUMOR: Primary | ICD-10-CM

## 2024-07-11 DIAGNOSIS — R91.1 LUNG NODULE: ICD-10-CM

## 2024-07-11 DIAGNOSIS — R59.0 MEDIASTINAL ADENOPATHY: Primary | ICD-10-CM

## 2024-07-11 PROCEDURE — 71250 CT THORAX DX C-: CPT | Performed by: RADIOLOGY

## 2024-07-11 PROCEDURE — 71250 CT THORAX DX C-: CPT

## 2024-07-11 PROCEDURE — 99214 OFFICE O/P EST MOD 30 MIN: CPT | Performed by: THORACIC SURGERY (CARDIOTHORACIC VASCULAR SURGERY)

## 2024-07-11 ASSESSMENT — PAIN SCALES - GENERAL: PAINLEVEL: 0-NO PAIN

## 2024-09-05 ENCOUNTER — LAB (OUTPATIENT)
Dept: LAB | Facility: LAB | Age: 64
End: 2024-09-05
Payer: COMMERCIAL

## 2024-09-05 DIAGNOSIS — Z00.00 WELLNESS EXAMINATION: ICD-10-CM

## 2024-09-05 DIAGNOSIS — I25.10 CORONARY ARTERY DISEASE INVOLVING NATIVE HEART, UNSPECIFIED VESSEL OR LESION TYPE, UNSPECIFIED WHETHER ANGINA PRESENT: ICD-10-CM

## 2024-09-05 DIAGNOSIS — E78.5 HYPERLIPIDEMIA, UNSPECIFIED HYPERLIPIDEMIA TYPE: ICD-10-CM

## 2024-09-05 LAB
ALBUMIN SERPL BCP-MCNC: 4.5 G/DL (ref 3.4–5)
ALP SERPL-CCNC: 115 U/L (ref 33–136)
ALT SERPL W P-5'-P-CCNC: 9 U/L (ref 10–52)
ANION GAP SERPL CALC-SCNC: 13 MMOL/L (ref 10–20)
AST SERPL W P-5'-P-CCNC: 15 U/L (ref 9–39)
BASOPHILS # BLD AUTO: 0.05 X10*3/UL (ref 0–0.1)
BASOPHILS NFR BLD AUTO: 0.5 %
BILIRUB SERPL-MCNC: 0.5 MG/DL (ref 0–1.2)
BUN SERPL-MCNC: 12 MG/DL (ref 6–23)
CALCIUM SERPL-MCNC: 10.2 MG/DL (ref 8.6–10.6)
CHLORIDE SERPL-SCNC: 106 MMOL/L (ref 98–107)
CHOLEST SERPL-MCNC: 131 MG/DL (ref 0–199)
CHOLESTEROL/HDL RATIO: 2.4
CO2 SERPL-SCNC: 29 MMOL/L (ref 21–32)
CREAT SERPL-MCNC: 0.98 MG/DL (ref 0.5–1.3)
EGFRCR SERPLBLD CKD-EPI 2021: 88 ML/MIN/1.73M*2
EOSINOPHIL # BLD AUTO: 0.05 X10*3/UL (ref 0–0.7)
EOSINOPHIL NFR BLD AUTO: 0.5 %
ERYTHROCYTE [DISTWIDTH] IN BLOOD BY AUTOMATED COUNT: 14.7 % (ref 11.5–14.5)
GLUCOSE SERPL-MCNC: 71 MG/DL (ref 74–99)
HCT VFR BLD AUTO: 45 % (ref 41–52)
HDLC SERPL-MCNC: 54.3 MG/DL
HGB BLD-MCNC: 14.7 G/DL (ref 13.5–17.5)
IMM GRANULOCYTES # BLD AUTO: 0.05 X10*3/UL (ref 0–0.7)
IMM GRANULOCYTES NFR BLD AUTO: 0.5 % (ref 0–0.9)
LDLC SERPL CALC-MCNC: 63 MG/DL
LYMPHOCYTES # BLD AUTO: 2.03 X10*3/UL (ref 1.2–4.8)
LYMPHOCYTES NFR BLD AUTO: 19.3 %
MCH RBC QN AUTO: 30.2 PG (ref 26–34)
MCHC RBC AUTO-ENTMCNC: 32.7 G/DL (ref 32–36)
MCV RBC AUTO: 93 FL (ref 80–100)
MONOCYTES # BLD AUTO: 0.69 X10*3/UL (ref 0.1–1)
MONOCYTES NFR BLD AUTO: 6.6 %
NEUTROPHILS # BLD AUTO: 7.64 X10*3/UL (ref 1.2–7.7)
NEUTROPHILS NFR BLD AUTO: 72.6 %
NON HDL CHOLESTEROL: 77 MG/DL (ref 0–149)
NRBC BLD-RTO: 0 /100 WBCS (ref 0–0)
PLATELET # BLD AUTO: 202 X10*3/UL (ref 150–450)
POTASSIUM SERPL-SCNC: 5.3 MMOL/L (ref 3.5–5.3)
PROT SERPL-MCNC: 7.8 G/DL (ref 6.4–8.2)
RBC # BLD AUTO: 4.86 X10*6/UL (ref 4.5–5.9)
SODIUM SERPL-SCNC: 143 MMOL/L (ref 136–145)
TRIGL SERPL-MCNC: 67 MG/DL (ref 0–149)
TSH SERPL-ACNC: 1.14 MIU/L (ref 0.44–3.98)
VLDL: 13 MG/DL (ref 0–40)
WBC # BLD AUTO: 10.5 X10*3/UL (ref 4.4–11.3)

## 2024-09-05 PROCEDURE — 85025 COMPLETE CBC W/AUTO DIFF WBC: CPT

## 2024-09-05 PROCEDURE — 84443 ASSAY THYROID STIM HORMONE: CPT

## 2024-09-05 PROCEDURE — 80053 COMPREHEN METABOLIC PANEL: CPT

## 2024-09-05 PROCEDURE — 36415 COLL VENOUS BLD VENIPUNCTURE: CPT

## 2024-09-05 PROCEDURE — 84153 ASSAY OF PSA TOTAL: CPT

## 2024-09-05 PROCEDURE — 80061 LIPID PANEL: CPT

## 2024-09-10 ENCOUNTER — APPOINTMENT (OUTPATIENT)
Dept: PRIMARY CARE | Facility: CLINIC | Age: 64
End: 2024-09-10
Payer: COMMERCIAL

## 2024-09-10 VITALS
OXYGEN SATURATION: 98 % | WEIGHT: 201.1 LBS | SYSTOLIC BLOOD PRESSURE: 132 MMHG | RESPIRATION RATE: 15 BRPM | TEMPERATURE: 97.1 F | BODY MASS INDEX: 25.81 KG/M2 | HEART RATE: 60 BPM | DIASTOLIC BLOOD PRESSURE: 88 MMHG | HEIGHT: 74 IN

## 2024-09-10 DIAGNOSIS — K21.9 GASTRO-ESOPHAGEAL REFLUX DISEASE WITHOUT ESOPHAGITIS: ICD-10-CM

## 2024-09-10 DIAGNOSIS — M25.561 CHRONIC PAIN OF RIGHT KNEE: ICD-10-CM

## 2024-09-10 DIAGNOSIS — G89.29 CHRONIC PAIN OF RIGHT KNEE: ICD-10-CM

## 2024-09-10 DIAGNOSIS — R05.3 CHRONIC COUGH: ICD-10-CM

## 2024-09-10 DIAGNOSIS — I25.84 CORONARY ARTERY CALCIFICATION: Primary | ICD-10-CM

## 2024-09-10 DIAGNOSIS — I25.10 CORONARY ARTERY CALCIFICATION: Primary | ICD-10-CM

## 2024-09-10 DIAGNOSIS — E78.5 HYPERLIPIDEMIA, UNSPECIFIED HYPERLIPIDEMIA TYPE: ICD-10-CM

## 2024-09-10 DIAGNOSIS — Z12.5 SCREENING PSA (PROSTATE SPECIFIC ANTIGEN): ICD-10-CM

## 2024-09-10 DIAGNOSIS — I25.10 CORONARY ARTERY DISEASE INVOLVING NATIVE HEART, UNSPECIFIED VESSEL OR LESION TYPE, UNSPECIFIED WHETHER ANGINA PRESENT: ICD-10-CM

## 2024-09-10 LAB — PSA SERPL-MCNC: 1.74 NG/ML

## 2024-09-10 PROCEDURE — 1036F TOBACCO NON-USER: CPT | Performed by: FAMILY MEDICINE

## 2024-09-10 PROCEDURE — 3008F BODY MASS INDEX DOCD: CPT | Performed by: FAMILY MEDICINE

## 2024-09-10 PROCEDURE — 99214 OFFICE O/P EST MOD 30 MIN: CPT | Performed by: FAMILY MEDICINE

## 2024-09-10 RX ORDER — OMEPRAZOLE 40 MG/1
40 CAPSULE, DELAYED RELEASE ORAL
Qty: 90 CAPSULE | Refills: 2 | Status: CANCELLED | OUTPATIENT
Start: 2024-09-10

## 2024-09-10 RX ORDER — ALBUTEROL SULFATE 90 UG/1
2 INHALANT RESPIRATORY (INHALATION) EVERY 4 HOURS PRN
Qty: 8.5 G | Refills: 0 | Status: SHIPPED | OUTPATIENT
Start: 2024-09-10 | End: 2025-09-10

## 2024-09-10 RX ORDER — PANTOPRAZOLE SODIUM 40 MG/1
40 TABLET, DELAYED RELEASE ORAL DAILY
Qty: 90 TABLET | Refills: 0 | Status: SHIPPED | OUTPATIENT
Start: 2024-09-10 | End: 2024-12-09

## 2024-09-10 RX ORDER — DICLOFENAC SODIUM 10 MG/G
4 GEL TOPICAL 4 TIMES DAILY
Qty: 100 G | Refills: 1 | Status: SHIPPED | OUTPATIENT
Start: 2024-09-10

## 2024-09-10 RX ORDER — OMEPRAZOLE 40 MG/1
40 CAPSULE, DELAYED RELEASE ORAL
COMMUNITY
End: 2024-09-10 | Stop reason: WASHOUT

## 2024-09-10 RX ORDER — GUAIFENESIN 600 MG/1
TABLET, EXTENDED RELEASE ORAL
Qty: 30 TABLET | Refills: 0 | Status: SHIPPED | OUTPATIENT
Start: 2024-09-10

## 2024-09-10 RX ORDER — FAMOTIDINE 40 MG/1
40 TABLET, FILM COATED ORAL DAILY
Qty: 90 TABLET | Refills: 3 | Status: CANCELLED | OUTPATIENT
Start: 2024-09-10

## 2024-09-10 ASSESSMENT — PATIENT HEALTH QUESTIONNAIRE - PHQ9
SUM OF ALL RESPONSES TO PHQ9 QUESTIONS 1 AND 2: 0
2. FEELING DOWN, DEPRESSED OR HOPELESS: NOT AT ALL
1. LITTLE INTEREST OR PLEASURE IN DOING THINGS: NOT AT ALL

## 2024-09-10 NOTE — PROGRESS NOTES
"Subjective   Patient ID: Rob Quinonez is a 63 y.o. male who presents for Follow-up (Pt is here for HTN fuv. Pt states he has had a persistent cough for the past few months.).    HPI       Cough X 4 months- cough  Smoking thc  Followed by thoratic team for mass that has not increased- getting CT yearly    GERD- has been with flair- using H2b andPP1 today- helped some    Knee - chronic pain- hx TKR  Using brace  No trauma      Review of Systems  All systems reviewed and neg if not noted in the HPI above       Objective   /88 (Patient Position: Sitting)   Pulse 60   Temp 36.2 °C (97.1 °F)   Resp 15   Ht 1.88 m (6' 2\")   Wt 91.2 kg (201 lb 1.6 oz)   SpO2 98%   BMI 25.82 kg/m²     Physical Exam  Pleasant  Eyes: conjunctiva non-icteric and eye lids are without obvious rash or drooping. Pupils are symmetric.   Ears, Nose, Mouth, and Throat: External ears and nose appear to be without deformity or rash. No lesions or masses noted. Hearing is grossly intact.   CV: RRR, no murmur  Pulm:CTA B/L  Abd: soft, NTTP, + BS  LE: no edema  Psychiatric: Alert, orientation to person, place, and time. Recent/remote memory as evidenced through face-to-face interaction and discussion appear grossly intact. Mood and affect are normal.        Assessment/Plan   Problem List Items Addressed This Visit             ICD-10-CM    CAD (coronary artery disease) I25.10    Gastro-esophageal reflux disease without esophagitis K21.9    Relevant Medications    pantoprazole (ProtoNix) 40 mg EC tablet    Hyperlipidemia E78.5    Knee pain M25.569    Relevant Medications    diclofenac sodium (Voltaren) 1 % gel     Other Visit Diagnoses         Codes    Coronary artery calcification    -  Primary I25.10, I25.84    Chronic cough     R05.3    Relevant Medications    guaiFENesin (Mucinex) 600 mg 12 hr tablet    albuterol (ProAir HFA) 90 mcg/actuation inhaler    Other Relevant Orders    Complete Pulmonary Function Test Pre/Post Bronchodialator " (Spirometry Pre/Post/DLCO/Lung Volumes)    Referral to Pulmonology    Screening PSA (prostate specific antigen)     Z12.5    Relevant Orders    PSA                   Please follow up as scheduled or as needed.

## 2024-09-10 NOTE — PATIENT INSTRUCTIONS
GERD  - switch to pantoprazole    Cough  - Albuterol as needed  - Mucinex  - Checking PFT  - Referral for pulm      Knee pain  - Turmeric Curcumin 450mg  - Topical creams- Voltaren gel, Salonpas, Icy hot, Bio freeze, Capsaicin, Asper cream, or Tiger balm (these are all over the counter)      Please follow up as scheduled or as needed.       ** If labs or imaging ordered at today's visit, all the non-urgent results will be discussed at your next visit    If you have been referred for a special test or to a specialist please call  [phone] to schedule an appointment.  If you have any further questions, or if develop new or worsened symptoms, please give our office a call at [phone].

## 2024-09-16 ENCOUNTER — APPOINTMENT (OUTPATIENT)
Dept: OPHTHALMOLOGY | Facility: CLINIC | Age: 64
End: 2024-09-16
Payer: COMMERCIAL

## 2024-09-16 DIAGNOSIS — H33.322 RETINAL HOLE OF LEFT EYE: ICD-10-CM

## 2024-09-16 DIAGNOSIS — H52.4 PRESBYOPIA: ICD-10-CM

## 2024-09-16 DIAGNOSIS — H18.513 CORNEAL GUTTATA OF BOTH EYES: ICD-10-CM

## 2024-09-16 DIAGNOSIS — Z83.511 FAMILY HISTORY OF GLAUCOMA: ICD-10-CM

## 2024-09-16 DIAGNOSIS — H35.412 LATTICE DEGENERATION OF PERIPHERAL RETINA, LEFT: ICD-10-CM

## 2024-09-16 DIAGNOSIS — H25.811 COMBINED FORM OF AGE-RELATED CATARACT, RIGHT EYE: Primary | ICD-10-CM

## 2024-09-16 DIAGNOSIS — H25.812 COMBINED FORM OF AGE-RELATED CATARACT, LEFT EYE: ICD-10-CM

## 2024-09-16 PROCEDURE — 92014 COMPRE OPH EXAM EST PT 1/>: CPT | Performed by: OPHTHALMOLOGY

## 2024-09-16 ASSESSMENT — EXTERNAL EXAM - LEFT EYE: OS_EXAM: NORMAL

## 2024-09-16 ASSESSMENT — CUP TO DISC RATIO
OS_RATIO: .4
OD_RATIO: .35

## 2024-09-16 ASSESSMENT — REFRACTION_WEARINGRX
OD_SPHERE: OTC READERS
OS_SPHERE: OTC READERS

## 2024-09-16 ASSESSMENT — CONF VISUAL FIELD
OD_INFERIOR_NASAL_RESTRICTION: 0
OS_SUPERIOR_TEMPORAL_RESTRICTION: 0
OS_NORMAL: 1
OS_SUPERIOR_NASAL_RESTRICTION: 0
OD_SUPERIOR_NASAL_RESTRICTION: 0
OD_SUPERIOR_TEMPORAL_RESTRICTION: 0
OS_INFERIOR_NASAL_RESTRICTION: 0
OD_INFERIOR_TEMPORAL_RESTRICTION: 0
OS_INFERIOR_TEMPORAL_RESTRICTION: 0
OD_NORMAL: 1

## 2024-09-16 ASSESSMENT — VISUAL ACUITY
OD_SC: 20/20
OD_SC+: -1
OS_SC: 20/20
OS_SC+: -2
METHOD: SNELLEN - LINEAR

## 2024-09-16 ASSESSMENT — TONOMETRY
OS_IOP_MMHG: 13
IOP_METHOD: GOLDMANN APPLANATION
OD_IOP_MMHG: 13

## 2024-09-16 ASSESSMENT — REFRACTION_MANIFEST
OS_CYLINDER: SPHERE
OD_SPHERE: +0.50
OS_SPHERE: +0.50
OD_CYLINDER: SPHERE

## 2024-09-16 ASSESSMENT — SLIT LAMP EXAM - LIDS
COMMENTS: GOOD POSITION
COMMENTS: GOOD POSITION

## 2024-09-16 ASSESSMENT — ENCOUNTER SYMPTOMS: EYES NEGATIVE: 1

## 2024-09-16 ASSESSMENT — EXTERNAL EXAM - RIGHT EYE: OD_EXAM: NORMAL

## 2024-09-16 NOTE — PROGRESS NOTES
Combined form of age-related cataract, right eyeH25.811  Combined form of age-related cataract, left eyeH25.812  -Not visually significant at this time. Monitor.     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  -Asymptomatic since last visit.   -Small atrophic retinal hole at 6 o`clock, no signs of traction, no SRF, (+)surrounding pigment/lattice.  -No retinal tear or detachment seen on exam. Retinal detachment symptoms discussed.     Cornea qacwuibY51.51  -Rare guttata. No corneal edema noted on exam today. If condition worsens, may require corneal surgery at a future time. Caution with cataract surgery - risk of prolonged corneal edema/visual recovery.     Family history of glaucoma in uchyjuM96.511  -FH of glaucoma - great grandmother and mother. Intraocular pressure and optic disc appear WNL/stable at this time. Monitor.     PnrubcijjoT55.4  -Continue OTC reading glasses. Declines Rx at this time.       PRIOR OCULAR HISTORY:    Subconjunctival hemorrhage of left eyeH11.32  Had recurrent FLOYD in 2017 (6-7 episodes). No associated pain or vision change. Blood pressure within normal range, not on anticoagulants. Denies trauma or other usual causes other than occasional eye rubbing.   -Normal platelet level - 232 (7/21/17)  -PT (7/28/17) - 10.5 (WNL)  -INR (7/28/17) - 1.0 (WNL)  -PTT (7/28/17) - 34 (WNL)  -No further episodes since last visit. No abnormal conjunctival vasculature seen on exam today. Recommend monitoring. Advised to contact me if continues to have recurrent episodes.   Previous ophthalmologist - Kane Tom MD (records requested at previous visit - not received)  [id number]  [place] [id number]  Phone - [phone]  Fax - [phone]

## 2024-09-23 ENCOUNTER — APPOINTMENT (OUTPATIENT)
Dept: UROLOGY | Facility: CLINIC | Age: 64
End: 2024-09-23
Payer: COMMERCIAL

## 2024-09-27 DIAGNOSIS — N52.9 ERECTILE DYSFUNCTION, UNSPECIFIED ERECTILE DYSFUNCTION TYPE: Primary | ICD-10-CM

## 2024-09-27 RX ORDER — SILDENAFIL 50 MG/1
50 TABLET, FILM COATED ORAL AS NEEDED
Qty: 30 TABLET | Refills: 0 | Status: SHIPPED | OUTPATIENT
Start: 2024-09-27 | End: 2024-10-27

## 2024-10-07 ENCOUNTER — APPOINTMENT (OUTPATIENT)
Dept: RESPIRATORY THERAPY | Facility: HOSPITAL | Age: 64
End: 2024-10-07
Payer: COMMERCIAL

## 2024-10-14 ENCOUNTER — APPOINTMENT (OUTPATIENT)
Dept: UROLOGY | Facility: CLINIC | Age: 64
End: 2024-10-14
Payer: COMMERCIAL

## 2024-10-14 DIAGNOSIS — Z12.5 SCREENING FOR PROSTATE CANCER: ICD-10-CM

## 2024-10-14 DIAGNOSIS — R31.21 ASYMPTOMATIC MICROSCOPIC HEMATURIA: Primary | ICD-10-CM

## 2024-10-14 LAB
POC APPEARANCE, URINE: CLEAR
POC BILIRUBIN, URINE: NEGATIVE
POC BLOOD, URINE: ABNORMAL
POC COLOR, URINE: YELLOW
POC GLUCOSE, URINE: NEGATIVE MG/DL
POC KETONES, URINE: NEGATIVE MG/DL
POC LEUKOCYTES, URINE: NEGATIVE
POC NITRITE,URINE: NEGATIVE
POC PH, URINE: 5.5 PH
POC PROTEIN, URINE: NEGATIVE MG/DL
POC SPECIFIC GRAVITY, URINE: 1.01
POC UROBILINOGEN, URINE: 0.2 EU/DL

## 2024-10-14 PROCEDURE — 87086 URINE CULTURE/COLONY COUNT: CPT

## 2024-10-14 PROCEDURE — 81003 URINALYSIS AUTO W/O SCOPE: CPT | Performed by: NURSE PRACTITIONER

## 2024-10-14 PROCEDURE — 51798 US URINE CAPACITY MEASURE: CPT | Performed by: NURSE PRACTITIONER

## 2024-10-14 PROCEDURE — 99213 OFFICE O/P EST LOW 20 MIN: CPT | Performed by: NURSE PRACTITIONER

## 2024-10-14 PROCEDURE — 81001 URINALYSIS AUTO W/SCOPE: CPT

## 2024-10-14 PROCEDURE — 1036F TOBACCO NON-USER: CPT | Performed by: NURSE PRACTITIONER

## 2024-10-14 PROCEDURE — G2211 COMPLEX E/M VISIT ADD ON: HCPCS | Performed by: NURSE PRACTITIONER

## 2024-10-14 NOTE — PROGRESS NOTES
"  [facility]  Outpatient Clinic Note    Subjective   Rob Quinonez is a 63 y.o. male    History of Present Illness   1. BPH with LUTS  2. ED on sildenafil 50 mg prn  3. Family history of prostate cancer (paternal grandfather, maternal grandfather, uncle)  4. Microscopic hematuria, benign workup 2022    Today's Visit:  63 year old male presents for routine FUV. Previously followed Dr. Kirkland. History of Hematuria with benign workup. NTF x 3-4. He returns to sleep easily and this is tolerable to him. NKDA. ED-taking Sildenafil 50 mg with good results. Denies gross hematuria, dysuria, flank pain, and bothersome urgency. Patient is a former smoker.     Cystoscopy 09/2022 by Dr. Kirkland. The anterior urethra is normal. There is bilobar enlargement of the prostate with obstruction. There is mild trabeculation. Careful inspection of the bladder revealed there to be no evidence of tumor, stones, foreign bodies or diverticula. There is clear efflux from each orifice.      Renal ultrasound 9/15/22 showed \"No hydronephrosis. No visualized urinary tract stone. Few incidentally noted simple cysts are seen in the kidneys. Minimal prostate enlargement noted.        Lab Results   Component Value Date    PSA 1.74 09/05/2024    PSA 0.80 04/28/2023    PSA 1.02 10/29/2021    PSA 0.85 09/02/2020    PSA 1.10 10/25/2019    PSA 0.55 11/30/2018     Past Medical History and Surgical History   Past Medical History:   Diagnosis Date    Abnormal findings on diagnostic imaging of other specified body structures 06/17/2021    Abnormal finding on radiology exam    Abnormal results of function studies of other organs and systems 05/11/2021    Abnormal PET scan of mediastinum    Body mass index (BMI) 26.0-26.9, adult 11/12/2020    Body mass index (BMI) of 26.0 to 26.9 in adult    Body mass index (BMI) 27.0-27.9, adult 01/14/2021    Body mass index (BMI) of 27.0 to 27.9 in adult    Conjunctival hemorrhage, left eye 02/05/2020    " Subconjunctival hemorrhage of left eye    Disease of stomach and duodenum, unspecified     Stomach problems    Encounter for screening for infections with a predominantly sexual mode of transmission 10/29/2021    Screening for STD (sexually transmitted disease)    Endocarditis, 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    Leaky heart valve    Family history of glaucoma 08/01/2022    Family history of glaucoma in mother    Gastro-esophageal reflux disease without esophagitis 12/02/2022    Gastro-esophageal reflux disease without esophagitis    Gastro-esophageal reflux disease without esophagitis 11/12/2020    Gastroesophageal reflux disease without esophagitis    Hemorrhage, not elsewhere classified 07/28/2017    Recurrent hemorrhage    Mixed hyperlipidemia 10/29/2021    Mixed hyperlipidemia    Nonrheumatic mitral (valve) insufficiency 11/12/2020    Mild mitral regurgitation by prior echocardiogram    Pain in right hand 12/02/2021    Pain of right hand    Pain, unspecified 11/13/2021    Body aches    Personal history of colonic polyps 01/11/2019    History of colonic polyps    Personal history of other benign neoplasm 10/30/2019    History of other benign neoplasm    Personal history of other diseases of the musculoskeletal system and connective tissue     History of arthritis    Personal history of other drug therapy 01/14/2021    History of pneumococcal vaccination    Personal history of other infectious and parasitic diseases     History of herpes simplex infection    Stiffness of right hand, not elsewhere classified 11/12/2021    Stiffness of right hand joint    [place]-neck deformity of unspecified finger(s) 12/03/2021    [place]-neck deformity    Unspecified injury of right wrist, hand and finger(s), initial encounter 03/03/2019    Injury of finger of right hand, initial encounter    Unspecified injury of unspecified wrist, hand and finger(s), sequela 03/15/2019    Finger injury, sequela    Urinary tract infection, site not  specified 11/06/2018    Acute lower UTI    Vomiting, unspecified 11/20/2020    Vomiting and diarrhea     Past Surgical History:   Procedure Laterality Date    COLONOSCOPY W/ POLYPECTOMY  07/21/2017    Complete Colonoscopy For Polyp Removal    OTHER SURGICAL HISTORY  07/21/2017    Knee Arthroscopy With Lysis Of Adhesions    OTHER SURGICAL HISTORY  10/06/2022    Nasal septoplasty    ROTATOR CUFF REPAIR  07/21/2017    Rotator Cuff Repair       Medications  Current Outpatient Medications on File Prior to Visit   Medication Sig Dispense Refill    albuterol (ProAir HFA) 90 mcg/actuation inhaler Inhale 2 puffs every 4 hours if needed for wheezing or shortness of breath. 8.5 g 0    aspirin 81 mg EC tablet Take by mouth.      atorvastatin (Lipitor) 40 mg tablet Take 1 tablet (40 mg) by mouth once daily. 90 tablet 3    diclofenac sodium (Voltaren) 1 % gel Apply 4.5 inches (4 g) topically 4 times a day. For your knee 100 g 1    famotidine (Pepcid) 40 mg tablet Take 1 tablet (40 mg) by mouth once daily. 90 tablet 0    guaiFENesin (Mucinex) 600 mg 12 hr tablet Use 1-2 tablets twice daily as needed for cough. Do not crush, chew, or split. 30 tablet 0    multivit-min-folic acid-vit K (Multi For Him, no iron,) 400-40 mcg capsule Take by mouth.      pantoprazole (ProtoNix) 40 mg EC tablet Take 1 tablet (40 mg) by mouth once daily. Do not crush, chew, or split. 90 tablet 0    sildenafil (Viagra) 50 mg tablet Take 1 tablet (50 mg) by mouth if needed for erectile dysfunction. 30 tablet 0     No current facility-administered medications on file prior to visit.       Objective   Physicial Exam  General: Well developed, well nourished, alert and cooperative, appears in no acute distress  Eyes: Non-injected conjunctiva, sclera clear, no proptosis  Cardiac: Extremities are warm and well perfused. No edema, cyanosis or pallor.   Lungs: Breathing is easy, non-labored. Speaking in clear and complete sentences. Normal diaphragmatic  movement.  MSK: Ambulatory with steady gait, unassisted  Neuro: alert and oriented to person, place and time  Psych: Demonstrates good judgement and reason, without hallucinations, abnormal affect or abnormal behaviors.  Skin: no obvious lesions, no rashes.    Appointment on 10/14/2024   Component Date Value Ref Range Status    POC Color, Urine 10/14/2024 Yellow  Straw, Yellow, Light-Yellow Final    POC Appearance, Urine 10/14/2024 Clear  Clear Final    POC Glucose, Urine 10/14/2024 NEGATIVE  NEGATIVE mg/dl Final    POC Bilirubin, Urine 10/14/2024 NEGATIVE  NEGATIVE Final    POC Ketones, Urine 10/14/2024 NEGATIVE  NEGATIVE mg/dl Final    POC Specific Gravity, Urine 10/14/2024 1.015  1.005 - 1.035 Final    POC Blood, Urine 10/14/2024 MODERATE (2+) (A)  NEGATIVE Final    POC PH, Urine 10/14/2024 5.5  No Reference Range Established PH Final    POC Protein, Urine 10/14/2024 NEGATIVE  NEGATIVE, 30 (1+) mg/dl Final    POC Urobilinogen, Urine 10/14/2024 0.2  0.2, 1.0 EU/DL Final    Poc Nitrite, Urine 10/14/2024 NEGATIVE  NEGATIVE Final    POC Leukocytes, Urine 10/14/2024 NEGATIVE  NEGATIVE Final      Review of Systems  All other systems have been reviewed and are negative for complaint.      Assessment and Plan   1. BPH with LUTS  2. ED on sildenafil 50 mg prn  3. Family history of prostate cancer (paternal grandfather, maternal grandfather, uncle)  4. Microscopic hematuria, benign workup 2022    Today's Visit:  63 year old male presents for routine FUV. Previously followed Dr. Kirkland. History of Hematuria with benign workup. NTF x 3-4. He returns to sleep easily and this is tolerable to him. NKDA. ED-taking Sildenafil 50 mg with good results. Denies gross hematuria, dysuria, flank pain, and bothersome urgency. Patient is a former smoker.     Cystoscopy 09/2022 by Dr. Kirkland. The anterior urethra is normal. There is bilobar enlargement of the prostate with obstruction. There is mild trabeculation. Careful inspection of the  "bladder revealed there to be no evidence of tumor, stones, foreign bodies or diverticula. There is clear efflux from each orifice.      Renal ultrasound 9/15/22 showed \"No hydronephrosis. No visualized urinary tract stone. Few incidentally noted simple cysts are seen in the kidneys.     PVR 2 ml     -ED Continue Sildenafil 50 mg prn. May increase up to 100 mg. Denies Nitroglycerin use.   No side effects or priapism.     Patient will obtain PSA in 6 months, RTC yearly, sooner if needed.     All questions and concerns were addressed. Patient verbalizes understanding and has no other questions at this time. You are able to have email access to your chart. You can sign into [employer] or add the [facility] on your smart phone to review today's visit and labs.     Gabby Boudreaux-- MERCEDES SALGUERO  Office Phone:  [phone]      "

## 2024-10-15 LAB
APPEARANCE UR: CLEAR
BACTERIA #/AREA URNS AUTO: ABNORMAL /HPF
BILIRUB UR STRIP.AUTO-MCNC: NEGATIVE MG/DL
COLOR UR: ABNORMAL
GLUCOSE UR STRIP.AUTO-MCNC: NORMAL MG/DL
KETONES UR STRIP.AUTO-MCNC: NEGATIVE MG/DL
LEUKOCYTE ESTERASE UR QL STRIP.AUTO: NEGATIVE
MUCOUS THREADS #/AREA URNS AUTO: ABNORMAL /LPF
NITRITE UR QL STRIP.AUTO: NEGATIVE
PH UR STRIP.AUTO: 5 [PH]
PROT UR STRIP.AUTO-MCNC: NEGATIVE MG/DL
RBC # UR STRIP.AUTO: ABNORMAL /UL
RBC #/AREA URNS AUTO: ABNORMAL /HPF
SP GR UR STRIP.AUTO: 1.01
SQUAMOUS #/AREA URNS AUTO: ABNORMAL /HPF
UROBILINOGEN UR STRIP.AUTO-MCNC: NORMAL MG/DL
WBC #/AREA URNS AUTO: ABNORMAL /HPF

## 2024-10-16 LAB — BACTERIA UR CULT: NO GROWTH

## 2024-10-24 ENCOUNTER — APPOINTMENT (OUTPATIENT)
Dept: RESPIRATORY THERAPY | Facility: HOSPITAL | Age: 64
End: 2024-10-24
Payer: COMMERCIAL

## 2024-11-21 DIAGNOSIS — N52.9 ERECTILE DYSFUNCTION, UNSPECIFIED ERECTILE DYSFUNCTION TYPE: ICD-10-CM

## 2024-11-22 ENCOUNTER — APPOINTMENT (OUTPATIENT)
Dept: RADIOLOGY | Facility: HOSPITAL | Age: 64
End: 2024-11-22
Payer: COMMERCIAL

## 2024-11-22 ENCOUNTER — HOSPITAL ENCOUNTER (EMERGENCY)
Facility: HOSPITAL | Age: 64
Discharge: HOME | End: 2024-11-22
Payer: COMMERCIAL

## 2024-11-22 ENCOUNTER — APPOINTMENT (OUTPATIENT)
Dept: CARDIOLOGY | Facility: HOSPITAL | Age: 64
End: 2024-11-22
Payer: COMMERCIAL

## 2024-11-22 VITALS
HEIGHT: 74 IN | HEART RATE: 89 BPM | BODY MASS INDEX: 25.67 KG/M2 | OXYGEN SATURATION: 96 % | WEIGHT: 200 LBS | TEMPERATURE: 98.4 F | SYSTOLIC BLOOD PRESSURE: 154 MMHG | DIASTOLIC BLOOD PRESSURE: 79 MMHG | RESPIRATION RATE: 16 BRPM

## 2024-11-22 DIAGNOSIS — N52.9 ERECTILE DYSFUNCTION, UNSPECIFIED ERECTILE DYSFUNCTION TYPE: ICD-10-CM

## 2024-11-22 DIAGNOSIS — J18.9 PNEUMONIA OF LEFT LOWER LOBE DUE TO INFECTIOUS ORGANISM: Primary | ICD-10-CM

## 2024-11-22 LAB
ALBUMIN SERPL BCP-MCNC: 3.6 G/DL (ref 3.4–5)
ALP SERPL-CCNC: 103 U/L (ref 33–136)
ALT SERPL W P-5'-P-CCNC: 8 U/L (ref 10–52)
ANION GAP SERPL CALC-SCNC: 12 MMOL/L (ref 10–20)
AST SERPL W P-5'-P-CCNC: 19 U/L (ref 9–39)
ATRIAL RATE: 93 BPM
ATRIAL RATE: 93 BPM
BASOPHILS # BLD AUTO: 0.07 X10*3/UL (ref 0–0.1)
BASOPHILS NFR BLD AUTO: 0.5 %
BILIRUB SERPL-MCNC: 0.4 MG/DL (ref 0–1.2)
BUN SERPL-MCNC: 10 MG/DL (ref 6–23)
CALCIUM SERPL-MCNC: 8.5 MG/DL (ref 8.6–10.3)
CARDIAC TROPONIN I PNL SERPL HS: 7 NG/L (ref 0–20)
CARDIAC TROPONIN I PNL SERPL HS: 9 NG/L (ref 0–20)
CHLORIDE SERPL-SCNC: 103 MMOL/L (ref 98–107)
CO2 SERPL-SCNC: 25 MMOL/L (ref 21–32)
CREAT SERPL-MCNC: 0.97 MG/DL (ref 0.5–1.3)
EGFRCR SERPLBLD CKD-EPI 2021: 88 ML/MIN/1.73M*2
EOSINOPHIL # BLD AUTO: 0.6 X10*3/UL (ref 0–0.7)
EOSINOPHIL NFR BLD AUTO: 4.7 %
ERYTHROCYTE [DISTWIDTH] IN BLOOD BY AUTOMATED COUNT: 13.7 % (ref 11.5–14.5)
FLUAV RNA RESP QL NAA+PROBE: NOT DETECTED
FLUBV RNA RESP QL NAA+PROBE: NOT DETECTED
GLUCOSE SERPL-MCNC: 102 MG/DL (ref 74–99)
HCT VFR BLD AUTO: 38.5 % (ref 41–52)
HGB BLD-MCNC: 12.9 G/DL (ref 13.5–17.5)
IMM GRANULOCYTES # BLD AUTO: 0.13 X10*3/UL (ref 0–0.7)
IMM GRANULOCYTES NFR BLD AUTO: 1 % (ref 0–0.9)
LACTATE SERPL-SCNC: 1.3 MMOL/L (ref 0.4–2)
LYMPHOCYTES # BLD AUTO: 1.5 X10*3/UL (ref 1.2–4.8)
LYMPHOCYTES NFR BLD AUTO: 11.7 %
MCH RBC QN AUTO: 29.7 PG (ref 26–34)
MCHC RBC AUTO-ENTMCNC: 33.5 G/DL (ref 32–36)
MCV RBC AUTO: 89 FL (ref 80–100)
MONOCYTES # BLD AUTO: 1.05 X10*3/UL (ref 0.1–1)
MONOCYTES NFR BLD AUTO: 8.2 %
NEUTROPHILS # BLD AUTO: 9.49 X10*3/UL (ref 1.2–7.7)
NEUTROPHILS NFR BLD AUTO: 73.9 %
NRBC BLD-RTO: 0 /100 WBCS (ref 0–0)
P AXIS: 107 DEGREES
P AXIS: 47 DEGREES
P OFFSET: 186 MS
P OFFSET: 205 MS
P ONSET: 153 MS
P ONSET: 153 MS
PLATELET # BLD AUTO: 354 X10*3/UL (ref 150–450)
POTASSIUM SERPL-SCNC: 4 MMOL/L (ref 3.5–5.3)
PR INTERVAL: 146 MS
PR INTERVAL: 150 MS
PROT SERPL-MCNC: 7.9 G/DL (ref 6.4–8.2)
Q ONSET: 226 MS
Q ONSET: 228 MS
QRS COUNT: 15 BEATS
QRS COUNT: 16 BEATS
QRS DURATION: 114 MS
QRS DURATION: 118 MS
QT INTERVAL: 384 MS
QT INTERVAL: 404 MS
QTC CALCULATION(BAZETT): 477 MS
QTC CALCULATION(BAZETT): 502 MS
QTC FREDERICIA: 444 MS
QTC FREDERICIA: 467 MS
R AXIS: 0 DEGREES
R AXIS: 7 DEGREES
RBC # BLD AUTO: 4.35 X10*6/UL (ref 4.5–5.9)
RSV RNA RESP QL NAA+PROBE: NOT DETECTED
SARS-COV-2 RNA RESP QL NAA+PROBE: NOT DETECTED
SODIUM SERPL-SCNC: 136 MMOL/L (ref 136–145)
T AXIS: 21 DEGREES
T AXIS: 9 DEGREES
T OFFSET: 420 MS
T OFFSET: 428 MS
VENTRICULAR RATE: 93 BPM
VENTRICULAR RATE: 93 BPM
WBC # BLD AUTO: 12.8 X10*3/UL (ref 4.4–11.3)

## 2024-11-22 PROCEDURE — 93005 ELECTROCARDIOGRAM TRACING: CPT | Mod: 76

## 2024-11-22 PROCEDURE — 83605 ASSAY OF LACTIC ACID: CPT

## 2024-11-22 PROCEDURE — 71046 X-RAY EXAM CHEST 2 VIEWS: CPT

## 2024-11-22 PROCEDURE — 2500000001 HC RX 250 WO HCPCS SELF ADMINISTERED DRUGS (ALT 637 FOR MEDICARE OP)

## 2024-11-22 PROCEDURE — 36415 COLL VENOUS BLD VENIPUNCTURE: CPT

## 2024-11-22 PROCEDURE — 99284 EMERGENCY DEPT VISIT MOD MDM: CPT | Mod: 25

## 2024-11-22 PROCEDURE — 84484 ASSAY OF TROPONIN QUANT: CPT

## 2024-11-22 PROCEDURE — 82565 ASSAY OF CREATININE: CPT

## 2024-11-22 PROCEDURE — 71046 X-RAY EXAM CHEST 2 VIEWS: CPT | Mod: FOREIGN READ | Performed by: RADIOLOGY

## 2024-11-22 PROCEDURE — 93005 ELECTROCARDIOGRAM TRACING: CPT

## 2024-11-22 PROCEDURE — 85025 COMPLETE CBC W/AUTO DIFF WBC: CPT

## 2024-11-22 PROCEDURE — 87637 SARSCOV2&INF A&B&RSV AMP PRB: CPT

## 2024-11-22 RX ORDER — AZITHROMYCIN 500 MG/1
500 TABLET, FILM COATED ORAL ONCE
Status: COMPLETED | OUTPATIENT
Start: 2024-11-22 | End: 2024-11-22

## 2024-11-22 RX ORDER — AZITHROMYCIN 250 MG/1
250 TABLET, FILM COATED ORAL DAILY
Qty: 4 TABLET | Refills: 0 | Status: SHIPPED | OUTPATIENT
Start: 2024-11-22 | End: 2024-11-26

## 2024-11-22 RX ORDER — SILDENAFIL 50 MG/1
50 TABLET, FILM COATED ORAL AS NEEDED
Qty: 30 TABLET | Refills: 11 | Status: SHIPPED | OUTPATIENT
Start: 2024-11-22 | End: 2024-11-25

## 2024-11-22 RX ORDER — AMOXICILLIN AND CLAVULANATE POTASSIUM 875; 125 MG/1; MG/1
1 TABLET, FILM COATED ORAL EVERY 12 HOURS
Qty: 14 TABLET | Refills: 0 | Status: SHIPPED | OUTPATIENT
Start: 2024-11-22 | End: 2024-11-29

## 2024-11-22 RX ORDER — AMOXICILLIN 500 MG/1
1000 CAPSULE ORAL ONCE
Status: COMPLETED | OUTPATIENT
Start: 2024-11-22 | End: 2024-11-22

## 2024-11-22 RX ORDER — ACETAMINOPHEN 325 MG/1
975 TABLET ORAL ONCE
Status: DISCONTINUED | OUTPATIENT
Start: 2024-11-22 | End: 2024-11-22 | Stop reason: HOSPADM

## 2024-11-22 ASSESSMENT — PAIN - FUNCTIONAL ASSESSMENT: PAIN_FUNCTIONAL_ASSESSMENT: 0-10

## 2024-11-22 ASSESSMENT — COLUMBIA-SUICIDE SEVERITY RATING SCALE - C-SSRS
1. IN THE PAST MONTH, HAVE YOU WISHED YOU WERE DEAD OR WISHED YOU COULD GO TO SLEEP AND NOT WAKE UP?: NO
2. HAVE YOU ACTUALLY HAD ANY THOUGHTS OF KILLING YOURSELF?: NO
6. HAVE YOU EVER DONE ANYTHING, STARTED TO DO ANYTHING, OR PREPARED TO DO ANYTHING TO END YOUR LIFE?: NO

## 2024-11-22 NOTE — DISCHARGE INSTRUCTIONS
Finish 7 days of antibiotics   Drink plenty of fluids and rest  You should feel better after 2-3 days of antibiotics

## 2024-11-22 NOTE — ED PROVIDER NOTES
Emergency Medicine Attending Attestation:     ED Course as of 11/24/24 2118 Fri Nov 22, 2024   0554 EKG personally interpreted by me. Ventricular rate 93 bpm.  TN interval 146 ms.  QRS duration 118 ms.  QT/QTc 404/502 ms.  Patient is in normal sinus rhythm at a ventricular rate of 93 bpm.  Normal axis.  There is good R wave progression.  There is a right bundle branch block.  No left bundle branch block.  He does have some ST depressions in V4 through V6.  This does appear to be slightly worsened than his previous EKG. []   0603 IMPRESSION:  Lingular consolidation, presumably pneumonia in the appropriate  clinical setting, although recommend radiographic follow-up upon  completion of appropriate medical therapy to evaluate for resolution.  Signed by Fito Lopez MD   []      ED Course User Index  [] Yossi Sheikh PA-C         Diagnoses as of 11/24/24 2118   Pneumonia of left lower lobe due to infectious organism       This patient was seen by the advanced practice provider.  I have personally performed a substantive portion of the encounter.  I have seen and examined the patient; agree with the workup, evaluation, MDM, management and diagnosis.  The care plan has been discussed.      I personally saw the patient and made/approved the management plan and take responsibility for the patient management.    Has a lingular pneumonia.  Has normal oxygen saturation.  Labs are normal.  Patient is comfortable going home.  I will treat him with Augmentin and Zithromax.  Prescription sent to his preferred pharmacy.  Discharge instructions provided and return instructions if he has severe shortness of breath or significant hemoptysis.            I independently interpreted patient's EKG and agree with the above mentioned interpretation.      #2 NSR HR 93, RBBB, no esthela or depression, no sig change from prior  #1 NSR HR 93, RBB, no esthela or depression    MD Randi Chaidez MD  11/22/24  0657       Randi Andrew MD  11/24/24 8493

## 2024-11-22 NOTE — ED PROVIDER NOTES
HPI   Chief Complaint   Patient presents with    Flu Symptoms     Per patient flu like symptoms , congestion, coughing and wheezing        64-year-old male with past medical history of CAD, hyperlipidemia, history of right bundle branch block presents the ED today with a chief concern of flulike symptoms.  Patient reports that for the past 2 weeks he has had cough and chest congestion.  He reports that he has been taking over-the-counter medications including Mucinex, Vicks, and TheraFlu.  He reports that he continues to have a cough.  He reports that the cough is mainly clear but he does have a little bit of brown sputum that he noticed this morning.  He denies any hemoptysis.  He reports chest congestion but denies any true pain.  Does not feel short of breath.  He denies any fevers.  He denies nausea or vomiting.  Denies syncope.  Denies sore throat.  No known exposure to sick contacts.  Up-to-date on all immunizations except for COVID and flu.  He denies any abdominal pain.  He denies any history of PE or DVT.  Did recently travel to [place].  No leg swelling.  No other symptoms or concerns at this time.      History provided by:  Patient   used: No            Patient History   Past Medical History:   Diagnosis Date    Abnormal findings on diagnostic imaging of other specified body structures 06/17/2021    Abnormal finding on radiology exam    Abnormal results of function studies of other organs and systems 05/11/2021    Abnormal PET scan of mediastinum    Body mass index (BMI) 26.0-26.9, adult 11/12/2020    Body mass index (BMI) of 26.0 to 26.9 in adult    Body mass index (BMI) 27.0-27.9, adult 01/14/2021    Body mass index (BMI) of 27.0 to 27.9 in adult    Conjunctival hemorrhage, left eye 02/05/2020    Subconjunctival hemorrhage of left eye    Disease of stomach and duodenum, unspecified     Stomach problems    Encounter for screening for infections with a predominantly sexual mode of  transmission 10/29/2021    Screening for STD (sexually transmitted disease)    Endocarditis, 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    Leaky heart valve    Family history of glaucoma 08/01/2022    Family history of glaucoma in mother    Gastro-esophageal reflux disease without esophagitis 12/02/2022    Gastro-esophageal reflux disease without esophagitis    Gastro-esophageal reflux disease without esophagitis 11/12/2020    Gastroesophageal reflux disease without esophagitis    Hemorrhage, not elsewhere classified 07/28/2017    Recurrent hemorrhage    Mixed hyperlipidemia 10/29/2021    Mixed hyperlipidemia    Nonrheumatic mitral (valve) insufficiency 11/12/2020    Mild mitral regurgitation by prior echocardiogram    Pain in right hand 12/02/2021    Pain of right hand    Pain, unspecified 11/13/2021    Body aches    Personal history of colonic polyps 01/11/2019    History of colonic polyps    Personal history of other benign neoplasm 10/30/2019    History of other benign neoplasm    Personal history of other diseases of the musculoskeletal system and connective tissue     History of arthritis    Personal history of other drug therapy 01/14/2021    History of pneumococcal vaccination    Personal history of other infectious and parasitic diseases     History of herpes simplex infection    Stiffness of right hand, not elsewhere classified 11/12/2021    Stiffness of right hand joint    [place]-neck deformity of unspecified finger(s) 12/03/2021    [place]-neck deformity    Unspecified injury of right wrist, hand and finger(s), initial encounter 03/03/2019    Injury of finger of right hand, initial encounter    Unspecified injury of unspecified wrist, hand and finger(s), sequela 03/15/2019    Finger injury, sequela    Urinary tract infection, site not specified 11/06/2018    Acute lower UTI    Vomiting, unspecified 11/20/2020    Vomiting and diarrhea     Past Surgical History:   Procedure Laterality Date    COLONOSCOPY W/ POLYPECTOMY   07/21/2017    Complete Colonoscopy For Polyp Removal    OTHER SURGICAL HISTORY  07/21/2017    Knee Arthroscopy With Lysis Of Adhesions    OTHER SURGICAL HISTORY  10/06/2022    Nasal septoplasty    ROTATOR CUFF REPAIR  07/21/2017    Rotator Cuff Repair     No family history on file.  Social History     Tobacco Use    Smoking status: Never    Smokeless tobacco: Never   Substance Use Topics    Alcohol use: Never    Drug use: Yes     Types: Marijuana       Physical Exam   ED Triage Vitals [11/22/24 0447]   Temperature Heart Rate Respirations BP   36.9 °C (98.4 °F) 94 20 140/87      Pulse Ox Temp Source Heart Rate Source Patient Position   99 % Temporal -- --      BP Location FiO2 (%)     -- --       Physical Exam  Gen.: Vitals noted no distress afebrile. Normal phonation, no stridor, no trismus. Patient is handling secretions well without any tripod positioning or drooling.  ENT: TMs clear bilaterally, EACs unremarkable. Mastoids nontender. Posterior oropharynx without erythema, exudate, or swelling. Uvula is in the midline and nonedematous. No Dwayne's Angina.   Neck: Supple. No meningismus through full range of motion. No lymphadenopathy.   Cardiac: Regular rate rhythm no murmur.   Lungs: Good aeration throughout. No adventitious breath sounds.   Abdomen: Soft nontender nonsurgical throughout  Extremities: No peripheral edema. extremities are moist with good skin turgor.  Skin: No rash.   Peripheral vascular: No peripheral edema.  No calf tenderness.  Neuro: No focal neurologic deficits. cranial nerves II-XII grossly intact.       ED Course & MDM   ED Course as of 11/23/24 2123 Fri Nov 22, 2024   0555 EKG personally interpreted by me. Ventricular rate 93 bpm.  IL interval 146 ms.  QRS duration 118 ms.  QT/QTc 404/502 ms.  Patient is in normal sinus rhythm at a ventricular rate of 93 bpm.  Normal axis.  There is good R wave progression.  There is a right bundle branch block.  No left bundle branch block.  He does  have some ST depressions in V4 through V6.  This does appear to be slightly worsened than his previous EKG. []   0603 IMPRESSION:  Lingular consolidation, presumably pneumonia in the appropriate  clinical setting, although recommend radiographic follow-up upon  completion of appropriate medical therapy to evaluate for resolution.  Signed by Fito Lopez MD   []      ED Course User Index  [] Yossi Sheikh PA-C         Diagnoses as of 11/23/24 2123   Pneumonia of left lower lobe due to infectious organism                 No data recorded     [place] Coma Scale Score: 15 (11/22/24 0445 : Jann Romo RN)                           Medical Decision Making  64-year-old male with past medical history of CAD, hyperlipidemia, history of right bundle branch block presents the ED today with a chief concern of flulike symptoms.  Vital signs reassuring.  Patient overall appears well and is nontoxic-appearing.  Patient has full range of motion of the neck without any meningismus.  Satting well on room air.  Not hypoxic.  Not tachycardic.  Afebrile.  Overall appears well.  Does not appear to be toxic appearing.  Low suspicion for PE at this time.  His x-ray shows evidence of pneumonia.  EKG shows no ischemic changes.  At the time of signout we we were pending labs.  I did start patient on amoxicillin and azithromycin in the ED.  Handoff to Dr. Andrew at 6 AM.    Differential diagnosis: RSV, COVID, influenza, group A strep, PTA, retropharyngeal abscess, meningitis, pneumonia, CHF, PE, aortic dissection, tamponade, pneumothorax    Disposition/treatment  1.    Handoff to Dr. Andrew at 6 AM        Procedure  Procedures     Yossi Sheikh PA-C  11/23/24 2124

## 2024-11-25 ENCOUNTER — TELEPHONE (OUTPATIENT)
Dept: UROLOGY | Facility: HOSPITAL | Age: 64
End: 2024-11-25
Payer: COMMERCIAL

## 2024-11-25 DIAGNOSIS — N52.9 ERECTILE DYSFUNCTION, UNSPECIFIED ERECTILE DYSFUNCTION TYPE: ICD-10-CM

## 2024-11-25 RX ORDER — SILDENAFIL 50 MG/1
50 TABLET, FILM COATED ORAL AS NEEDED
Qty: 30 TABLET | Refills: 0 | OUTPATIENT
Start: 2024-11-25 | End: 2024-12-25

## 2024-11-25 RX ORDER — SILDENAFIL 50 MG/1
50 TABLET, FILM COATED ORAL AS NEEDED
Qty: 30 TABLET | Refills: 3 | Status: SHIPPED | OUTPATIENT
Start: 2024-11-25 | End: 2024-12-25

## 2024-11-25 NOTE — TELEPHONE ENCOUNTER
Rx sent to [facility] 11/22  Patient requests Rx be sent to [facility], [place] instead. Sent

## 2024-12-03 ENCOUNTER — HOSPITAL ENCOUNTER (OUTPATIENT)
Dept: RESPIRATORY THERAPY | Facility: HOSPITAL | Age: 64
Discharge: HOME | End: 2024-12-03
Payer: COMMERCIAL

## 2024-12-03 DIAGNOSIS — R05.3 CHRONIC COUGH: ICD-10-CM

## 2024-12-03 PROCEDURE — 94060 EVALUATION OF WHEEZING: CPT | Performed by: INTERNAL MEDICINE

## 2024-12-03 PROCEDURE — 94729 DIFFUSING CAPACITY: CPT | Performed by: INTERNAL MEDICINE

## 2024-12-03 PROCEDURE — 94726 PLETHYSMOGRAPHY LUNG VOLUMES: CPT

## 2024-12-03 PROCEDURE — 94726 PLETHYSMOGRAPHY LUNG VOLUMES: CPT | Performed by: INTERNAL MEDICINE

## 2024-12-04 ENCOUNTER — TELEPHONE (OUTPATIENT)
Dept: PRIMARY CARE | Facility: CLINIC | Age: 64
End: 2024-12-04

## 2024-12-04 NOTE — TELEPHONE ENCOUNTER
Patient asks for a phone call.  Needs to discuss his results urgently  Will be out of town Monday.  Please do not write him anything in [employer]  He doesn't use it ( per patient )  [phone]  TY

## 2024-12-05 DIAGNOSIS — J18.9 LINGULAR PNEUMONIA: Primary | ICD-10-CM

## 2024-12-05 NOTE — TELEPHONE ENCOUNTER
Let him know I reviewed the ER's note   the chest x-ray shows pneumonia and he needs a repeat x-ray after he completes his antibiotics  Schedule for a sick appointment for exam  Let me know if he does not feel improvement on antibiotics

## 2024-12-09 ENCOUNTER — LAB (OUTPATIENT)
Dept: LAB | Facility: LAB | Age: 64
End: 2024-12-09
Payer: COMMERCIAL

## 2024-12-09 ENCOUNTER — APPOINTMENT (OUTPATIENT)
Dept: PRIMARY CARE | Facility: CLINIC | Age: 64
End: 2024-12-09
Payer: COMMERCIAL

## 2024-12-09 ENCOUNTER — HOSPITAL ENCOUNTER (OUTPATIENT)
Dept: RADIOLOGY | Facility: CLINIC | Age: 64
Discharge: HOME | End: 2024-12-09
Payer: COMMERCIAL

## 2024-12-09 VITALS
BODY MASS INDEX: 24.9 KG/M2 | SYSTOLIC BLOOD PRESSURE: 134 MMHG | HEART RATE: 84 BPM | OXYGEN SATURATION: 98 % | DIASTOLIC BLOOD PRESSURE: 82 MMHG | HEIGHT: 74 IN | RESPIRATION RATE: 14 BRPM | WEIGHT: 194 LBS | TEMPERATURE: 97 F

## 2024-12-09 DIAGNOSIS — G47.26 SHIFT WORK SLEEP DISORDER: ICD-10-CM

## 2024-12-09 DIAGNOSIS — K21.9 GASTRO-ESOPHAGEAL REFLUX DISEASE WITHOUT ESOPHAGITIS: ICD-10-CM

## 2024-12-09 DIAGNOSIS — J18.9 LINGULAR PNEUMONIA: ICD-10-CM

## 2024-12-09 DIAGNOSIS — J18.9 LINGULAR PNEUMONIA: Primary | ICD-10-CM

## 2024-12-09 DIAGNOSIS — R40.0 DAYTIME SLEEPINESS: ICD-10-CM

## 2024-12-09 LAB
BASOPHILS # BLD AUTO: 0.12 X10*3/UL (ref 0–0.1)
BASOPHILS NFR BLD AUTO: 1.2 %
EOSINOPHIL # BLD AUTO: 0.99 X10*3/UL (ref 0–0.7)
EOSINOPHIL NFR BLD AUTO: 9.9 %
ERYTHROCYTE [DISTWIDTH] IN BLOOD BY AUTOMATED COUNT: 14.1 % (ref 11.5–14.5)
HCT VFR BLD AUTO: 42 % (ref 41–52)
HGB BLD-MCNC: 13 G/DL (ref 13.5–17.5)
IMM GRANULOCYTES # BLD AUTO: 0.05 X10*3/UL (ref 0–0.7)
IMM GRANULOCYTES NFR BLD AUTO: 0.5 % (ref 0–0.9)
LYMPHOCYTES # BLD AUTO: 2.72 X10*3/UL (ref 1.2–4.8)
LYMPHOCYTES NFR BLD AUTO: 27.2 %
MAGNESIUM SERPL-MCNC: 2.51 MG/DL (ref 1.6–2.4)
MCH RBC QN AUTO: 28.7 PG (ref 26–34)
MCHC RBC AUTO-ENTMCNC: 31 G/DL (ref 32–36)
MCV RBC AUTO: 93 FL (ref 80–100)
MONOCYTES # BLD AUTO: 0.93 X10*3/UL (ref 0.1–1)
MONOCYTES NFR BLD AUTO: 9.3 %
NEUTROPHILS # BLD AUTO: 5.18 X10*3/UL (ref 1.2–7.7)
NEUTROPHILS NFR BLD AUTO: 51.9 %
NRBC BLD-RTO: 0 /100 WBCS (ref 0–0)
PLATELET # BLD AUTO: 338 X10*3/UL (ref 150–450)
RBC # BLD AUTO: 4.53 X10*6/UL (ref 4.5–5.9)
VIT B12 SERPL-MCNC: 671 PG/ML (ref 211–911)
WBC # BLD AUTO: 10 X10*3/UL (ref 4.4–11.3)

## 2024-12-09 PROCEDURE — 71046 X-RAY EXAM CHEST 2 VIEWS: CPT | Performed by: RADIOLOGY

## 2024-12-09 PROCEDURE — 1036F TOBACCO NON-USER: CPT | Performed by: FAMILY MEDICINE

## 2024-12-09 PROCEDURE — 71046 X-RAY EXAM CHEST 2 VIEWS: CPT

## 2024-12-09 PROCEDURE — 36415 COLL VENOUS BLD VENIPUNCTURE: CPT

## 2024-12-09 PROCEDURE — 99214 OFFICE O/P EST MOD 30 MIN: CPT | Performed by: FAMILY MEDICINE

## 2024-12-09 PROCEDURE — 83735 ASSAY OF MAGNESIUM: CPT

## 2024-12-09 PROCEDURE — 82607 VITAMIN B-12: CPT

## 2024-12-09 PROCEDURE — 3008F BODY MASS INDEX DOCD: CPT | Performed by: FAMILY MEDICINE

## 2024-12-09 PROCEDURE — 85025 COMPLETE CBC W/AUTO DIFF WBC: CPT

## 2024-12-09 RX ORDER — PANTOPRAZOLE SODIUM 40 MG/1
40 TABLET, DELAYED RELEASE ORAL DAILY
Qty: 90 TABLET | Refills: 0 | Status: SHIPPED | OUTPATIENT
Start: 2024-12-09 | End: 2025-12-04

## 2024-12-09 NOTE — PATIENT INSTRUCTIONS
*Please accept my condolences for the loss of your loved one*    Insomnia  - referral sleep med team  - referral for sleep psych  - tried melatonin and trazodone- didnt help    Pneumonia  - completed abx and feeling better  - chest xray ordered today  - labs    GERD  - To try to hold the pantoprazole- use as needed  - check labs          Please follow up As scheduled in APRIL or as needed.       ** If labs or imaging ordered at today's visit, all the non-urgent results will be discussed at your next visit    If you have been referred for a special test or to a specialist please call  [phone] to schedule an appointment.  If you have any further questions, or if develop new or worsened symptoms, please give our office a call at [phone].

## 2024-12-09 NOTE — PROGRESS NOTES
"Subjective   Patient ID: Rob Quinonez is a 64 y.o. male who presents for Follow-up (Pt is here following up after ER visit, for pneumonia and xray results. Still with cough.).    HPI       This pleasant 64-year-old male with past medical history of CAD, hyperlipidemia, history of right bundle branch block is here for ER follow up  Went to ER for for flu like symtpoms  cough and chest congestion.   Found to have pnuemonia  Started on amoxicillin and azithromycin in the ED   Of note- followed by Thoracic sx for anterior mediastinal mass ( was picked up on ct cardiac scoring). Sx recommended radiographic surveillance with CT chest [id number](due inb [id number]) as the PET suggested that the mass was likely benign.       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  INDICATION:  Cough.  COMPARISON:  None Available.  ACCESSION NUMBER(S):  [id number]  ORDERING CLINICIAN:  ARCADIO AMARAL  TECHNIQUE:  Frontal and lateral chest.   FINDINGS:  The size of the cardiac silhouette is normal.  Lingular consolidation.   No pneumothorax or pleural effusion.  IMPRESSION:  Lingular consolidation, presumably pneumonia in the appropriate  clinical setting, although recommend radiographic follow-up upon  completion of appropriate medical therapy to evaluate for resolution.  Signed by Fito Lopez MD       Review of Systems  All systems reviewed and neg if not noted in the HPI above       Objective   /82 (Patient Position: Sitting)   Pulse 84   Temp 36.1 °C (97 °F)   Resp 14   Ht 1.88 m (6' 2\")   Wt 88 kg (194 lb)   SpO2 98%   BMI 24.91 kg/m²     Physical Exam  Vitals reviewed. "   Constitutional:       Appearance: Normal appearance.   HENT:      Head: Normocephalic.   Eyes:      Extraocular Movements: Extraocular movements intact.   Cardiovascular:      Rate and Rhythm: Normal rate and regular rhythm.      Heart sounds: Normal heart sounds. No murmur heard.  Pulmonary:      Effort: Pulmonary effort is normal. No respiratory distress.      Breath sounds: Normal breath sounds. No wheezing.   Abdominal:      General: Bowel sounds are normal. There is no distension.      Palpations: Abdomen is soft.   Skin:     General: Skin is warm and dry.   Neurological:      General: No focal deficit present.      Mental Status: He is alert and oriented to person, place, and time.   Psychiatric:         Mood and Affect: Mood normal.         Behavior: Behavior normal.         Thought Content: Thought content normal.         Judgment: Judgment normal.         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

## 2025-03-21 ENCOUNTER — APPOINTMENT (OUTPATIENT)
Dept: PRIMARY CARE | Facility: CLINIC | Age: 65
End: 2025-03-21
Payer: COMMERCIAL

## 2025-04-14 ENCOUNTER — HOSPITAL ENCOUNTER (OUTPATIENT)
Dept: RADIOLOGY | Facility: HOSPITAL | Age: 65
Discharge: HOME | End: 2025-04-14
Payer: COMMERCIAL

## 2025-04-14 DIAGNOSIS — E61.2 MAGNESIUM DEFICIENCY: Primary | ICD-10-CM

## 2025-04-14 DIAGNOSIS — D64.9 ANEMIA, UNSPECIFIED TYPE: ICD-10-CM

## 2025-04-14 DIAGNOSIS — J18.9 LINGULAR PNEUMONIA: ICD-10-CM

## 2025-04-14 PROCEDURE — 71250 CT THORAX DX C-: CPT

## 2025-04-14 PROCEDURE — 71250 CT THORAX DX C-: CPT | Performed by: STUDENT IN AN ORGANIZED HEALTH CARE EDUCATION/TRAINING PROGRAM

## 2025-04-15 DIAGNOSIS — Z01.818 PRE-OP TESTING: ICD-10-CM

## 2025-04-15 DIAGNOSIS — R91.1 LUNG NODULE: Primary | ICD-10-CM

## 2025-04-15 NOTE — PROGRESS NOTES
Bronchoscopy Scheduling Request    Pre-bronchoscopy visit: New patient visit with Bronchoscopy group provider  Please schedule procedure: Next available    Cytology on-site:  Yes  Location:  [facility]  Performing physician:  Advanced diagnostic bronchoscopist  Referring physician:  Dalton Castaneda MD, Vicky Rivers,   Indication:  TYELR nodules, largest 3.5 cm  Sedation / Anesthesia:  GA  Procedure:  Airway exam, BAL, TBNA, TBBx, Navigational bronchoscopy, Radial EBUS, Staging EBUS  Time:  Tier 3  Fluorscopy:   Yes  Imaging needed:  CT Shan - same day as procedure  Labs:  CBC, BMP  Meds:  None  Special Considerations:  LLL nodule as well, stable anterior mediastinal nodule  Reviewed by:  Jatinder Hdez MD on  4/15/25

## 2025-04-16 ENCOUNTER — APPOINTMENT (OUTPATIENT)
Dept: SLEEP MEDICINE | Facility: CLINIC | Age: 65
End: 2025-04-16
Payer: COMMERCIAL

## 2025-04-16 VITALS
WEIGHT: 211.2 LBS | BODY MASS INDEX: 27.11 KG/M2 | TEMPERATURE: 97.7 F | HEART RATE: 71 BPM | HEIGHT: 74 IN | SYSTOLIC BLOOD PRESSURE: 154 MMHG | DIASTOLIC BLOOD PRESSURE: 75 MMHG

## 2025-04-16 DIAGNOSIS — R40.0 DAYTIME SLEEPINESS: ICD-10-CM

## 2025-04-16 DIAGNOSIS — G25.81 RESTLESS LEG SYNDROME: Primary | ICD-10-CM

## 2025-04-16 PROCEDURE — 1036F TOBACCO NON-USER: CPT | Performed by: NURSE PRACTITIONER

## 2025-04-16 PROCEDURE — 99204 OFFICE O/P NEW MOD 45 MIN: CPT | Performed by: NURSE PRACTITIONER

## 2025-04-16 PROCEDURE — 3008F BODY MASS INDEX DOCD: CPT | Performed by: NURSE PRACTITIONER

## 2025-04-16 ASSESSMENT — SLEEP AND FATIGUE QUESTIONNAIRES
HOW LIKELY ARE YOU TO NOD OFF OR FALL ASLEEP WHILE WATCHING TV: HIGH CHANCE OF DOZING
ESS-CHAD TOTAL SCORE: 8
WAKING_TOO_EARLY: MODERATE
HOW LIKELY ARE YOU TO NOD OFF OR FALL ASLEEP WHILE LYING DOWN TO REST IN THE AFTERNOON WHEN CIRCUMSTANCES PERMIT: WOULD NEVER DOZE
WORRIED_DISTRESSED_DUE_TO_SLEEP: A LITTLE
HOW LIKELY ARE YOU TO NOD OFF OR FALL ASLEEP WHILE SITTING AND READING: HIGH CHANCE OF DOZING
HOW LIKELY ARE YOU TO NOD OFF OR FALL ASLEEP WHILE SITTING AND TALKING TO SOMEONE: WOULD NEVER DOZE
HOW LIKELY ARE YOU TO NOD OFF OR FALL ASLEEP WHEN YOU ARE A PASSENGER IN A CAR FOR AN HOUR WITHOUT A BREAK: WOULD NEVER DOZE
SITING INACTIVE IN A PUBLIC PLACE LIKE A CLASS ROOM OR A MOVIE THEATER: MODERATE CHANCE OF DOZING
HOW LIKELY ARE YOU TO NOD OFF OR FALL ASLEEP IN A CAR, WHILE STOPPED FOR A FEW MINUTES IN TRAFFIC: WOULD NEVER DOZE
SATISFACTION_WITH_CURRENT_SLEEP_PATTERN: SATISFIED
SLEEP_PROBLEM_INTERFERES_DAILY_ACTIVITIES: SOMEWHAT
SLEEP_PROBLEM_NOTICEABLE_TO_OTHERS: MUCH
HOW LIKELY ARE YOU TO NOD OFF OR FALL ASLEEP WHILE SITTING QUIETLY AFTER LUNCH WITHOUT ALCOHOL: WOULD NEVER DOZE
DIFFICULTY_STAYING_ASLEEP: MODERATE

## 2025-04-16 NOTE — PROGRESS NOTES
Patient: Rob Quinonez    [id number]  : 1960 -- AGE 64 y.o.    Provider: CONNIE Acosta     Location [facility]   Service Date: 2025              [facility]  New Visit Note    HISTORY OF PRESENT ILLNESS     The patient's referring provider is: Vicky Rivers DO    HISTORY OF PRESENT ILLNESS   Rob Quinonez is a 64 y.o. male who presents to a [facility] for a sleep medicine evaluation with concerns of daytime sleepiness, restless leg. Retired from [employer] recently. Works [employer] on the side.     The patient has h/o  has a past medical history of Abnormal findings on diagnostic imaging of other specified body structures (2021), Abnormal results of function studies of other organs and systems (2021), Body mass index (BMI) 26.0-26.9, adult (2020), Body mass index (BMI) 27.0-27.9, adult (2021), Conjunctival hemorrhage, left eye (2020), Disease of stomach and duodenum, unspecified, Encounter for screening for infections with a predominantly sexual mode of transmission (10/29/2021), Endocarditis, valve unspecified (2018), Family history of glaucoma (2022), Gastro-esophageal reflux disease without esophagitis (2022), Gastro-esophageal reflux disease without esophagitis (2020), Hemorrhage, not elsewhere classified (2017), Mixed hyperlipidemia (10/29/2021), Nonrheumatic mitral (valve) insufficiency (2020), Pain in right hand (2021), Pain, unspecified (2021), Personal history of colonic polyps (2019), Personal history of other benign neoplasm (10/30/2019), Personal history of other diseases of the musculoskeletal system and connective tissue, Personal history of other drug therapy (2021), Personal history of other infectious and parasitic diseases, Stiffness of right hand, not elsewhere classified (2021), [place]-neck  deformity of unspecified finger(s) (12/03/2021), Unspecified injury of right wrist, hand and finger(s), initial encounter (03/03/2019), Unspecified injury of unspecified wrist, hand and finger(s), sequela (03/15/2019), Urinary tract infection, site not specified (11/06/2018), and Vomiting, unspecified (11/20/2020)..    Past Sleep History  Patient has not had a sleep study in the past.     Current History    On today's visit, the patient reports daytime sleepiness starting in the fall. Notes he was diagnosed with pneumonia over the winter. Went to [place] for a few months. Feeling better now but continues to sleep for short time, wakes often in the night. Uses bathroom a few times. Tl reports he does not snore but notes he has woken up feeling like he is gasping, reflux symptoms in the night. Notes he retired in July     Sleep schedule  on weekdays / work days:  Usual Bedtime:    varies   Falls asleep around:  9PM to 11 PM  # of awakenings: several  Wake time:  7 AM    Naps: varies, 15 min to 2 hours daily     Preferred sleeping position: side, reclined     Sleep-related ROS:    Problems going to sleep: No problems going to sleep    Problems staying asleep Problems Staying Asleep: nocturia, breathing problems, no clear reason, and reflux    Breathing during sleep: snoring, gasping/choking for air, mouth breathing, and nocturnal reflux symptoms    Daytime Symptoms  On awakening patient reports: wake unrefreshed, feels sleepy, and morning sore throat    RLS screen:  RLSSCREEN: - Sensations: Patient does not have unusual sensations in their extremities that cause an urge to move them   - Movement: Patient has been told that their legs kick or jerk during sleep    Sleep-related behaviors:   dreams upon falling asleep  kicking, punching, or acting out dreams- kicking in his sleep    Fatigue: denies     ESS: 8   SEVERINO: 11  FOSQ:  38      REVIEW OF SYSTEMS     REVIEW OF SYSTEMS  Review of Systems   All other systems reviewed  "and are negative.    ALLERGIES AND MEDICATIONS     ALLERGIES  Allergies[1]    MEDICATIONS  Current Medications[2]      PAST HISTORY     PAST MEDICAL HISTORY  Medical History[3]    PAST SURGICAL HISTORY:  Surgical History[4]    FAMILY HISTORY  Family History[5]    DOES/DOES NOT EC: does not have a family history of sleep disorder.      SOCIAL HISTORY  He  reports that he has never smoked. He has never used smokeless tobacco. He reports current drug use. Drug: Marijuana. He reports that he does not drink alcohol. He currently lives  [facility] .     Caffeine consumption: occasional  Alcohol consumption: 2x week  Smoking: quit 2016  Marijuana: recreationally, daily     PHYSICAL EXAM     VITAL SIGNS: /75 (BP Location: Right arm, Patient Position: Sitting, BP Cuff Size: Adult)   Pulse 71   Temp 36.5 °C (97.7 °F) (Oral)   Ht 1.88 m (6' 2\")   Wt 95.8 kg (211 lb 3.2 oz)   BMI 27.12 kg/m²      PREVIOUS WEIGHTS:  Wt Readings from Last 3 Encounters:   04/16/25 95.8 kg (211 lb 3.2 oz)   12/09/24 88 kg (194 lb)   11/22/24 90.7 kg (200 lb)       Physical Exam  Physical Exam   Constitutional: Alert and oriented, cooperative, no obvious distress   HEENT: Non icteric or anemic, EOM WNL bilaterally     Upper Airway Examination:   Modified Mallampati Class: 1  OP Lateral wall narrowing stgstrstastdstest:st st1st Tonsil ndGndrndanddndend:nd nd2nd Narrow A-P diameter   No high arched palate  Tongue Scalloping: Y   Retrognathia: N  Overjet: N    Neck: Supple, no JVD, no goiter, no adenopathy  Chest: CTA bilaterally, no wheezing, crackles, rubs   Cardiac: RRR, S1 and S2, no murmur, rub, thrill   Abdomen: Obese, Soft, nontender, no masses, no organomegaly   Extremities: No clubbing, no LL edema   Neuromuscular: Cranial nerves grossly intact, no focal deficits        RESULTS/DATA     Bicarbonate   Date Value   11/22/2024 25 mmol/L   09/05/2024 29 mmol/L   06/15/2023 CANCELED   05/31/2023 28 mmol/L   05/31/2023 CANCELED       Pulmonary Functions Testing Results:    No " "results found for: \"FEV1\", \"FVC\", \"ZQP6NUX\", \"TLC\", \"DLCO\"    Echo:  Results for orders placed in visit on 05/31/23    Echocardiogram    Narrative  [facility], [address] [address]  Tel [phone] and Fax [phone]    TRANSTHORACIC ECHOCARDIOGRAM REPORT      Patient Name:     RADHA FISCHER Reading Physician:   [id number] Shlomo Bailey MD  Study Date:       5/31/2023        Referring Physician: GERMAN AVENDANO  MRN/PID:          [id number]         PCP:  Accession/Order#: [id number]     Department Location: [facility]  YOB: 1960       Fellow:  Gender:           M                Nurse:  Admit Date:                        Sonographer:         Cyril Zhang [facility]  Admission Status: Outpatient       Additional Staff:  Height:           187.96 cm        CC Report to:  Weight:           92.99 kg         Study Type:          Echocardiogram  BSA:              2.20 m2  Blood Pressure: 110 /56 mmHg    Diagnosis/ICD: I25.10-Atherosclerotic heart disease of native coronary artery  without angina pectoris; Z01.810-Encounter for preprocedural  cardiovascular examination; I45.10-Unspecified right bundle  branch block  Indication:    CAD, Pre OP, RBBB  Procedure/CPT: Echo Complete w Full Doppler-[id number]    Patient History:  Pertinent History: RBBB, significant HLD, NUSRAT (asymptomatic CAD), quit smoking  tobacco.    Study Detail: The following Echo studies were performed: 2D, M-Mode, Doppler and  color flow. Technically challenging study due to prominent lung  artifact.      PHYSICIAN INTERPRETATION:  Left Ventricle: The left ventricular systolic function is normal, with an estimated ejection fraction of 60-65%. The calculated ejection fraction is normal at 56 % using the Yin's Bi-plane MOD calculation. There are no regional wall motion abnormalities. The left ventricular cavity size is normal. Spectral Doppler shows a normal pattern of left ventricular diastolic " filling.  Left Atrium: The left atrium is upper limits of normal in size.  Right Ventricle: The right ventricle is normal in size. There is normal right ventriclar wall thickness. There is normal right ventricular global systolic function.  Right Atrium: The right atrium is normal in size.  Aortic Valve: The aortic valve appears structurally normal. The aortic valve appears tricuspid and non-restricted. There is no evidence of aortic valve regurgitation. The peak instantaneous gradient of the aortic valve is 4.5 mmHg. The mean gradient of the aortic valve is 2.3 mmHg.  Mitral Valve: The mitral valve is normal in structure. There is systolic bowing of the mitral valve leaflets. There is mild mitral valve regurgitation.  Tricuspid Valve: The tricuspid valve is structurally normal. There is normal tricuspid valve leaflet mobility. There is trace tricuspid regurgitation.  Pulmonic Valve: The pulmonic valve is structurally normal. There is trace to mild pulmonic valve regurgitation.  Pericardium: There is no pericardial effusion noted.  Aorta: The aortic root is normal. The Ao Sinus is 3.40 cm. The Asc Ao is 3.00 cm. There is no dilatation of the aortic arch. There is no dilatation of the ascending aorta. The aortic root is at the upper limits of normal size.  Pulmonary Artery: The pulmonary artery is normal in size. The estimated pulmonary artery pressure is normal.  Systemic Veins: The inferior vena cava appears mildly dilated.      CONCLUSIONS:  1. Left ventricular systolic function is normal with a 60-65% estimated ejection fraction.  2. Mild mitral valve regurgitation.    QUANTITATIVE DATA SUMMARY:  2D MEASUREMENTS:  Normal Ranges:  LAs:           4.04 cm   (2.7-4.0cm)  IVSd:          1.11 cm   (0.6-1.1cm)  LVPWd:         1.00 cm   (0.6-1.1cm)  LVIDd:         4.86 cm   (3.9-5.9cm)  LVIDs:         2.51 cm  LV Mass Index: 84.5 g/m2  LV % FS        48.3 %    LA VOLUME:  Normal Ranges:  LA Vol A4C:        71.8 ml     (22+/-6mL/m2)  LA Vol A2C:        103.4 ml  LA Vol BP:         88.3 ml  LA Vol Index A4C:  32.7ml/m2  LA Vol Index A2C:  47.1 ml/m2  LA Vol Index BP:   40.2 ml/m2  LA Area A4C:       22.7 cm2  LA Area A2C:       27.9 cm2  LA Major Axis A4C: 6.1 cm  LA Major Axis A2C: 6.4 cm  LA Vol A4C:        65.0 ml  LA Vol A2C:        97.6 ml    RA VOLUME BY A/L METHOD:  Normal Ranges:  RA Vol A4C:        45.0 ml    (8.3-19.5ml)  RA Vol Index A4C:  20.5 ml/m2  RA Area A4C:       16.6 cm2  RA Major Axis A4C: 5.2 cm    AORTA MEASUREMENTS:  Normal Ranges:  Ao Sinus, d: 3.40 cm (2.1-3.5cm)  Ao STJ, d:   2.80 cm (1.7-3.4cm)  Asc Ao, d:   3.00 cm (2.1-3.4cm)    LV SYSTOLIC FUNCTION BY 2D PLANIMETRY (MOD):  Normal Ranges:  EF-A4C View: 58.4 % (>=55%)  EF-A2C View: 55.8 %  EF-Biplane:  56.1 %    LV DIASTOLIC FUNCTION:  Normal Ranges:  MV Peak E:        0.64 m/s   (0.7-1.2 m/s)  MV Peak A:        0.56 m/s   (0.42-0.7 m/s)  E/A Ratio:        1.15       (1.0-2.2)  MV e'             0.06 m/s   (>8.0)  MV lateral e'     0.07 m/s  MV medial e'      0.05 m/s  E/e' Ratio:       10.66      (<8.0)  PulmV Sys Abilio:    52.76 cm/s  PulmV Garcia Abilio:   55.90 cm/s  PulmV S/D Abilio:    0.94  PulmV A Revs Abilio: 55.64 cm/s    MITRAL VALVE:  Normal Ranges:  MV DT: 277 msec (150-240msec)    AORTIC VALVE:  Normal Ranges:  AoV Vmax:                1.06 m/s (<=1.7m/s)  AoV Peak P.5 mmHg (<20mmHg)  AoV Mean P.3 mmHg (1.7-11.5mmHg)  LVOT Max Abilio:            0.86 m/s (<=1.1m/s)  AoV VTI:                 22.94 cm (18-25cm)  LVOT VTI:                17.28 cm  LVOT Diameter:           2.10 cm  (1.8-2.4cm)  AoV Area, VTI:           2.60 cm2 (2.5-5.5cm2)  AoV Area,Vmax:           2.79 cm2 (2.5-4.5cm2)  AoV Dimensionless Index: 0.75    RIGHT VENTRICLE:  RV 1   3.5 cm  RV 2   2.9 cm  RV 3   7.4 cm  TAPSE: 23.2 mm  RV s'  0.11 m/s    TRICUSPID VALVE/RVSP:  Normal Ranges:  IVC Diam: 2.00 cm    PULMONIC VALVE:  Normal Ranges:  PV Max Abilio: 1.1 m/s   (0.6-0.9m/s)  PV Max P.7 mmHg    Pulmonary Veins:  PulmV A Revs Abilio: 55.64 cm/s  PulmV Garcia Abilio:   55.90 cm/s  PulmV S/D Abilio:    0.94  PulmV Sys Abilio:    52.76 cm/s      [id number] Shlomo Bailey MD  Electronically signed on 6/3/2023 at 9:37:07 AM    Failed to redirect to the Timeline version of the REVFS SmartLink.      PAP Adherence:      ASSESSMENT/PLAN     Mr. Quinonez is a 64 y.o. male and He was referred to the [facility] for evaluation of daytime sleepiness     Problem List and Orders  Problem List Items Addressed This Visit           ICD-10-CM    Restless leg syndrome - Primary G25.81    Notes leg kicking in the night  Will check iron and ferritin and plan for replacement pending results          Relevant Orders    Ferritin    Daytime sleepiness R40.0    Suspected sleep apnea  Will order HSAT to evaluate breathing and consider treatment pending results          Relevant Orders    Home sleep apnea test (HSAT)        -- Nga to call with results when available          [1] No Known Allergies  [2]   Current Outpatient Medications   Medication Sig Dispense Refill    albuterol (ProAir HFA) 90 mcg/actuation inhaler Inhale 2 puffs every 4 hours if needed for wheezing or shortness of breath. 8.5 g 0    aspirin 81 mg EC tablet Take by mouth.      atorvastatin (Lipitor) 40 mg tablet Take 1 tablet (40 mg) by mouth once daily. 90 tablet 3    diclofenac sodium (Voltaren) 1 % gel Apply 4.5 inches (4 g) topically 4 times a day. For your knee 100 g 1    famotidine (Pepcid) 40 mg tablet Take 1 tablet (40 mg) by mouth once daily. 90 tablet 0    multivit-min-folic acid-vit K (Multi For Him, no iron,) 400-40 mcg capsule Take by mouth.      pantoprazole (ProtoNix) 40 mg EC tablet Take 1 tablet (40 mg) by mouth once daily. Do not crush, chew, or split. 90 tablet 0    sildenafil (Viagra) 50 mg tablet Take 1 tablet (50 mg) by mouth if needed for erectile dysfunction. 30 tablet 3     No current  facility-administered medications for this visit.   [3]   Past Medical History:  Diagnosis Date    Abnormal findings on diagnostic imaging of other specified body structures 06/17/2021    Abnormal finding on radiology exam    Abnormal results of function studies of other organs and systems 05/11/2021    Abnormal PET scan of mediastinum    Body mass index (BMI) 26.0-26.9, adult 11/12/2020    Body mass index (BMI) of 26.0 to 26.9 in adult    Body mass index (BMI) 27.0-27.9, adult 01/14/2021    Body mass index (BMI) of 27.0 to 27.9 in adult    Conjunctival hemorrhage, left eye 02/05/2020    Subconjunctival hemorrhage of left eye    Disease of stomach and duodenum, unspecified     Stomach problems    Encounter for screening for infections with a predominantly sexual mode of transmission 10/29/2021    Screening for STD (sexually transmitted disease)    Endocarditis, 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    Leaky heart valve    Family history of glaucoma 08/01/2022    Family history of glaucoma in mother    Gastro-esophageal reflux disease without esophagitis 12/02/2022    Gastro-esophageal reflux disease without esophagitis    Gastro-esophageal reflux disease without esophagitis 11/12/2020    Gastroesophageal reflux disease without esophagitis    Hemorrhage, not elsewhere classified 07/28/2017    Recurrent hemorrhage    Mixed hyperlipidemia 10/29/2021    Mixed hyperlipidemia    Nonrheumatic mitral (valve) insufficiency 11/12/2020    Mild mitral regurgitation by prior echocardiogram    Pain in right hand 12/02/2021    Pain of right hand    Pain, unspecified 11/13/2021    Body aches    Personal history of colonic polyps 01/11/2019    History of colonic polyps    Personal history of other benign neoplasm 10/30/2019    History of other benign neoplasm    Personal history of other diseases of the musculoskeletal system and connective tissue     History of arthritis    Personal history of other drug therapy 01/14/2021    History of  pneumococcal vaccination    Personal history of other infectious and parasitic diseases     History of herpes simplex infection    Stiffness of right hand, not elsewhere classified 11/12/2021    Stiffness of right hand joint    [place]-neck deformity of unspecified finger(s) 12/03/2021    [place]-neck deformity    Unspecified injury of right wrist, hand and finger(s), initial encounter 03/03/2019    Injury of finger of right hand, initial encounter    Unspecified injury of unspecified wrist, hand and finger(s), sequela 03/15/2019    Finger injury, sequela    Urinary tract infection, site not specified 11/06/2018    Acute lower UTI    Vomiting, unspecified 11/20/2020    Vomiting and diarrhea   [4]   Past Surgical History:  Procedure Laterality Date    COLONOSCOPY W/ POLYPECTOMY  07/21/2017    Complete Colonoscopy For Polyp Removal    OTHER SURGICAL HISTORY  07/21/2017    Knee Arthroscopy With Lysis Of Adhesions    OTHER SURGICAL HISTORY  10/06/2022    Nasal septoplasty    ROTATOR CUFF REPAIR  07/21/2017    Rotator Cuff Repair   [5] No family history on file.

## 2025-04-16 NOTE — ASSESSMENT & PLAN NOTE
Notes leg kicking in the night  Will check iron and ferritin and plan for replacement pending results

## 2025-04-16 NOTE — PATIENT INSTRUCTIONS
[facility] Sleep Medicine  DO [address]  [facility]  [address] [facility] [place] [address]       NAME: Rob Quinonez   DATE:  04/16/25    DIAGNOSIS:   1. Restless leg syndrome  Ferritin    Ferritin      2. Daytime sleepiness  Referral to Adult Sleep Medicine    Home sleep apnea test (HSAT)      3. Shift work sleep disorder  Referral to Adult Sleep Medicine          Thank you for coming to the Sleep Medicine Clinic today! Your sleep medicine provider today was: CONNIE Acosta Below is a summary of your treatment plan, other important information, and our contact numbers:    TREATMENT PLAN:   - Follow-up in 2-3 months.  - If not already done, sign up for 'My Chart' and send prescription requests or messages through this    Scheduling a Sleep Study    Call the  Sleep Testing Center to speak with a sleep testing  to book your overnight sleep study procedure at one of our adult and pediatric-friendly sleep labs. Overnight sleep studies may be scheduled on a weekday or weekend.    We have child life services on a case by case basis at the [facility] location. We also perform daytime testing for shift workers on a case by case basis.    Locations for sleep studies are: [facility], [facility]), [facility], [facility], [place], [facility], [place], [place].    Bring your usual medications and nightly routine items for your sleep study. In order to fall asleep faster in sleep lab, we advise patients to wake up earlier on the morning of the scheduled testing and avoid napping prior to testing. Sometimes, your provider may prescribe a sleep aid to be taken at lights out in the sleep lab. If you are taking a sleep aid, please have somebody pick you up after the sleep testing.    Results of your sleep study will be given to the ordering clinician. Please contact their office for results or follow up as directed by your clinician.    For  additional information about the sleep medicine services, please call [phone]       Obstructive sleep apnea (SUNITA): SUNITA is a sleep disorder where your upper airway muscles relax during sleep and the airway intermittently and repetitively narrows and collapses leading to blocked airway (apnea) which, in turn, can disrupt breathing in sleep, lower oxygen levels while you sleep and cause night time wakings. Because apnea may cause higher carbon dioxide or low oxygen levels, untreated SUNITA can lead to heart arrhythmia, elevation of blood pressure, and make it harder for the body to consolidate memory and metabolize (leading to higher blood sugars at night).   Frequent partial arousals occur during sleep resulting in sleep deprivation and daytime sleepiness. SUNITA is associated with an increased risk of cardiovascular disease, stroke, hypertension, and insulin resistance. Moreover, untreated SUNITA with excessive daytime sleepiness can increase the risk of motor vehicular accidents.    Some conservative strategies for SUNITA are:   Positional therapy - Avoid sleeping on your back.   Healthy diet, exercise, and optimizing weight encouraged.   Avoid alcohol late in the evening as it can make sleep apnea worse.     Safety: Avoid driving and operating heavy equipment while sleepy. Drowsy driving may lead to life-threatening motor vehicle accidents.     Common treatment options for sleep apnea include weight management, positional therapy, Positive Airway Therapy (PAP) therapy, oral appliance therapy, hypoglossal nerve stimulation, and select airway surgeries.     Instructions - Common SUNITA Recs: - For your sleep apnea, continue to use your PAP every night and use it whenever you are sleeping.   - Avoid alcohol or sedatives several hours prior to sleeping.   - Get additional supplies for your PAP (e.g., mask, hose, filters) every 3 months or as your insurance allows from your [employer] company. Replacement cushions for your PAP mask can  be requested monthly if airseals are an issue.  - Remember to clean your mask, tubings, and water chamber regularly as instructed.  - Avoid driving or operating heavy machinery when drowsy. A person driving while sleepy is five (5) times more likely to have an accident. If you feel sleepy, pull over and take a short power nap (sleep for less than 30 minutes). Otherwise, ask somebody to drive you.    EASY WAYS TO IMPROVE YOUR SLEEP:  1. Go to bed and wake up at the same time every day.   Aim for 8 hours but some people need less, some need more.   Get out of bed if you are not sleeping.   Limit naps to 20 min or less.   2. Expose yourself to daylight and/ or bright light in the morning.   Go outside or spend time near a window each morning.   You can use a light box (found on [employer]) if you wake before the sunrise.   Limit light exposure in the evenings (including electronic usage).   Try meditation, reading, stretching, deep breathing, warm shower or bath, or yoga nidra as part of your bedtime routine. There are many great FREE, videos or audio tracks on [employer], etc for guidance.  3. Exercise, in some form, EVERY day, but not too close to bedtime. Consider making this part of your routine at the start of your day, followed by a cool shower.  4. Eat meals at roughly the same time every day. Make sure you are prioritizing fruits, vegetables, whole grains, lean proteins.  5. Time your caffeine intake. Make sure you are not drinking caffeine within 8 to 12 hours prior to your bedtime.   6. Avoid marijuana, alcohol, and nicotine. They will reduce sleep quality in any quantity.  7. Learn to manage anxiety. Psychology services at  can be reached at [phone] to schedule an appointment.     IMPORTANT INFORMATION:     Call 911 for medical emergencies.  Our offices are generally open from Monday-Friday, 9 am - 5 pm.  If you need to get in touch with me, you may either call me and my team(number is below) or you  can use [facility].  If a referral for a test, for CPAP, or for another specialist was made, and you have not heard about scheduling this within a week, please call scheduling at [phone] ([phone]).  If you are unable to make your appointment for clinic or an overnight study, kindly call the office at least 48 hours in advance to cancel and reschedule.  If you are on CPAP, please bring your device's card to each clinic appointment unless told otherwise by your provider.  There are no supporting services by either the sleep doctors or their staff on weekends and Holidays, or after 5 PM on weekdays.   If you have been asked to come to a sleep study, make sure you bring toiletries, a comfy pillow, and any nighttime medications that you may regularly take. Also be sure to eat dinner before you arrive. We generally do not provide meals.      PRESCRIPTIONS:  We require 7 days advanced notice for prescription refills. If we do not receive the request in this time, we cannot guarantee that your medication will be refilled in time. Please contact the sleep nurses listed below for refills or request via [facility].     IMPORTANT PHONE NUMBERS:   Sleep Medicine Clinic Fax: [phone]  Appointments (for Pediatric Sleep Clinic): [phone] - option 1  Appointments (for Adult Sleep Clinic): [phone] - option 2  Appointments (For Sleep Studies): [phone] - option 3  Behavioral Sleep Medicine: [phone]  Sleep Surgery: [phone]  ENT (Otolaryngology): [phone]  Headache Clinic (Neurology): [phone]  Neurology: [phone]  Psychiatry: [phone]  Pulmonary Function Testing (PFT) Center: [phone]  Pulmonary Medicine: [phone]  [employer] (DME): [phone]  [employer] (DME): [phone]   ([facility]): [phone]    Our Adult Sleep Medicine Team (Please do not hesitate to call the office or sleep nurse with any questions between  appointments):    Adult Sleep Nurses (Jigna Ghotra, RN and Nga Peter, RN):  For clinical questions and refilling prescriptions: [phone]  Email sleep diaries and other documents at: [email or web address]    Adult Sleep Medicine Secretaries:  Teresa Del Toro (For Priyank/Wood/Krise/Strohl/Yeh): P: [phone]  F: [phone]  Arnold Barnes ([place])Office: [phone] option 4 Office Fax: [phone]  Jenny Rey (For Sheehan): P: [phone]  Fax: [phone]  Katharine Chau (For Jurcevic/Blank): P: [phone]  F: [phone]  Sima Jiménez (For Nisha): P: [phone]  F: [phone]  Radha Krishnan (For Katie/Ervin/Zakhary): P: [phone]  F: [phone]  Tamia Spaulding (For Monson/Parsons): P: [phone]  F: [phone]     Adult Sleep Medicine Advanced Practice Providers:  Breezy Toussaint ([place], [place])  Itzel Mueller ([facility])  Randi Alvarado CNP ([place], [place], [place])  Tami Mc CNP ([place], [place], [place])  Caroline Cameron ([facility], [place], [place])  Miki Parsons CNP ([facility])      Our Sleep Testing Center (STC) Locations:  Our team will contact you to schedule your sleep study, however, you can contact us as follow:  Main Phone Line (scheduling only): [phone]), option 3  Adult and Pediatric Locations  [facility] (6 years and older): [facility] - 4th floor ([address] [place] [address]) After hours line: [phone]  [facility] (Main campus: All ages): [place], 6th floor. After hours line: [phone]   [place] (5 years and older; younger considered on case-by-case basis): [address]; [address], [address]. Scheduling  After hours line: [phone]   [place] (6 years and older): [address]; [address]; [address]   [place] (6 years and older): [address] [facility] [address]  After hours line:  [phone]   [place] (13 years and older) in [place]: [address], 2nd floor  After hours line: [phone]   [place] (13 year and older): [address]  After hours line: [phone] (Home studies out of [facility])    Adult Only Locations:   [place] (18 years and older): [address] [facility], 2nd floor   [place] (18 years and older): [address] [facility]; 4th floor  After hours line: [phone]  [facility] (18 years and older) at [place]: [address] [facility]  After hours line: [phone]        CONTACTING YOUR SLEEP MEDICINE PROVIDER:  Send a message directly to your provider through \"My Chart\", which is the email service through your  Records Account: [email or web address]   Call [phone] and leave a message. One of the administrative assistants will forward the message to your sleep medicine provider through \"My Chart\" and/or email.     Your sleep medicine provider for this visit was: Randi Alvarado, APRN-CNP    In the event that you are running more than 15 minutes late to your appointment, I will kindly ask you to reschedule.       "

## 2025-04-16 NOTE — ASSESSMENT & PLAN NOTE
Suspected sleep apnea  Will order HSAT to evaluate breathing and consider treatment pending results

## 2025-04-22 LAB
BASOPHILS # BLD AUTO: 41 CELLS/UL (ref 0–200)
BASOPHILS NFR BLD AUTO: 0.6 %
EOSINOPHIL # BLD AUTO: 152 CELLS/UL (ref 15–500)
EOSINOPHIL NFR BLD AUTO: 2.2 %
ERYTHROCYTE [DISTWIDTH] IN BLOOD BY AUTOMATED COUNT: 14.9 % (ref 11–15)
FERRITIN SERPL-MCNC: 51 NG/ML (ref 24–380)
HCT VFR BLD AUTO: 48.5 % (ref 38.5–50)
HGB BLD-MCNC: 15.2 G/DL (ref 13.2–17.1)
IRON SATN MFR SERPL: 18 % (CALC) (ref 20–48)
IRON SERPL-MCNC: 69 MCG/DL (ref 50–180)
LYMPHOCYTES # BLD AUTO: 2153 CELLS/UL (ref 850–3900)
LYMPHOCYTES NFR BLD AUTO: 31.2 %
MAGNESIUM SERPL-MCNC: 2.3 MG/DL (ref 1.5–2.5)
MCH RBC QN AUTO: 29 PG (ref 27–33)
MCHC RBC AUTO-ENTMCNC: 31.3 G/DL (ref 32–36)
MCV RBC AUTO: 92.4 FL (ref 80–100)
MONOCYTES # BLD AUTO: 455 CELLS/UL (ref 200–950)
MONOCYTES NFR BLD AUTO: 6.6 %
NEUTROPHILS # BLD AUTO: 4099 CELLS/UL (ref 1500–7800)
NEUTROPHILS NFR BLD AUTO: 59.4 %
PLATELET # BLD AUTO: 227 THOUSAND/UL (ref 140–400)
PMV BLD REES-ECKER: 11.6 FL (ref 7.5–12.5)
PSA SERPL-MCNC: 1.03 NG/ML
RBC # BLD AUTO: 5.25 MILLION/UL (ref 4.2–5.8)
TIBC SERPL-MCNC: 376 MCG/DL (CALC) (ref 250–425)
WBC # BLD AUTO: 6.9 THOUSAND/UL (ref 3.8–10.8)

## 2025-04-23 LAB
ANION GAP SERPL CALCULATED.4IONS-SCNC: 9 MMOL/L (CALC) (ref 7–17)
BUN SERPL-MCNC: 15 MG/DL (ref 7–25)
BUN/CREAT SERPL: NORMAL (CALC) (ref 6–22)
CALCIUM SERPL-MCNC: 9.7 MG/DL (ref 8.6–10.3)
CHLORIDE SERPL-SCNC: 106 MMOL/L (ref 98–110)
CO2 SERPL-SCNC: 28 MMOL/L (ref 20–32)
CREAT SERPL-MCNC: 1.04 MG/DL (ref 0.7–1.35)
EGFRCR SERPLBLD CKD-EPI 2021: 81 ML/MIN/1.73M2
ERYTHROCYTE [DISTWIDTH] IN BLOOD BY AUTOMATED COUNT: 15.1 % (ref 11–15)
GLUCOSE SERPL-MCNC: 89 MG/DL (ref 65–99)
HCT VFR BLD AUTO: 47.7 % (ref 38.5–50)
HGB BLD-MCNC: 15.3 G/DL (ref 13.2–17.1)
MCH RBC QN AUTO: 29.5 PG (ref 27–33)
MCHC RBC AUTO-ENTMCNC: 32.1 G/DL (ref 32–36)
MCV RBC AUTO: 91.9 FL (ref 80–100)
PLATELET # BLD AUTO: 212 THOUSAND/UL (ref 140–400)
PMV BLD REES-ECKER: 11.9 FL (ref 7.5–12.5)
POTASSIUM SERPL-SCNC: 5.2 MMOL/L (ref 3.5–5.3)
RBC # BLD AUTO: 5.19 MILLION/UL (ref 4.2–5.8)
SODIUM SERPL-SCNC: 143 MMOL/L (ref 135–146)
WBC # BLD AUTO: 7 THOUSAND/UL (ref 3.8–10.8)

## 2025-04-24 ENCOUNTER — APPOINTMENT (OUTPATIENT)
Dept: PRIMARY CARE | Facility: CLINIC | Age: 65
End: 2025-04-24
Payer: COMMERCIAL

## 2025-04-24 VITALS
WEIGHT: 205 LBS | SYSTOLIC BLOOD PRESSURE: 122 MMHG | OXYGEN SATURATION: 96 % | RESPIRATION RATE: 14 BRPM | HEIGHT: 74 IN | HEART RATE: 78 BPM | DIASTOLIC BLOOD PRESSURE: 80 MMHG | BODY MASS INDEX: 26.31 KG/M2 | TEMPERATURE: 97 F

## 2025-04-24 DIAGNOSIS — I25.10 CORONARY ARTERY DISEASE INVOLVING NATIVE HEART, UNSPECIFIED VESSEL OR LESION TYPE, UNSPECIFIED WHETHER ANGINA PRESENT: ICD-10-CM

## 2025-04-24 DIAGNOSIS — K21.9 GASTRO-ESOPHAGEAL REFLUX DISEASE WITHOUT ESOPHAGITIS: ICD-10-CM

## 2025-04-24 DIAGNOSIS — E61.1 LOW IRON: ICD-10-CM

## 2025-04-24 DIAGNOSIS — Z00.00 WELLNESS EXAMINATION: Primary | ICD-10-CM

## 2025-04-24 DIAGNOSIS — E78.5 HYPERLIPIDEMIA, UNSPECIFIED HYPERLIPIDEMIA TYPE: ICD-10-CM

## 2025-04-24 PROCEDURE — 1036F TOBACCO NON-USER: CPT | Performed by: FAMILY MEDICINE

## 2025-04-24 PROCEDURE — 3008F BODY MASS INDEX DOCD: CPT | Performed by: FAMILY MEDICINE

## 2025-04-24 PROCEDURE — 99396 PREV VISIT EST AGE 40-64: CPT | Performed by: FAMILY MEDICINE

## 2025-04-24 RX ORDER — ATORVASTATIN CALCIUM 40 MG/1
40 TABLET, FILM COATED ORAL DAILY
Qty: 90 TABLET | Refills: 3 | Status: SHIPPED | OUTPATIENT
Start: 2025-04-24

## 2025-04-24 RX ORDER — PANTOPRAZOLE SODIUM 40 MG/1
40 TABLET, DELAYED RELEASE ORAL DAILY
Qty: 90 TABLET | Refills: 0 | Status: SHIPPED | OUTPATIENT
Start: 2025-04-24 | End: 2026-04-19

## 2025-04-24 ASSESSMENT — PATIENT HEALTH QUESTIONNAIRE - PHQ9
1. LITTLE INTEREST OR PLEASURE IN DOING THINGS: NOT AT ALL
SUM OF ALL RESPONSES TO PHQ9 QUESTIONS 1 AND 2: 0
2. FEELING DOWN, DEPRESSED OR HOPELESS: NOT AT ALL

## 2025-04-24 NOTE — PROGRESS NOTES
"Subjective   Patient ID: Rob Quinonez is a 64 y.o. male who presents for Annual Exam.    HPI     Here for a physical  Does not want a chaperone.       Health Maintenance:  -   Colonoscopy: 1/25/2022- repeat in 5 yrs  -   Prostate screening: PSA-4/22/2025  -   CT calcium score-4/5/2021-score 23.5  -   AAA screening- hx of smoking- due at 67yr  -   Hep C screening- completed  Immunizations due:  - COVID, shingles- declined          Other concerns/issues/followup:    1 -follow-up medicine.  Ordered home sleep apnea testing due to daytime sleepiness (scheduled for June)  2-family history of prostate cancer -followed by urology PSA 4/22/2025 was normal   3- doing well other wise        [facility] was reviewed & updated.        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   HENT:      Head: Normocephalic.      Right Ear: External ear normal.      Left Ear: External ear normal.      Nose: Nose normal.      Mouth/Throat:      Pharynx: Oropharynx is clear.   Eyes:      Extraocular Movements: Extraocular movements intact.   Neck:      Thyroid: No thyroid mass, thyromegaly or thyroid tenderness.      Vascular: No carotid bruit.   Cardiovascular:      Rate and Rhythm: Normal rate and regular rhythm.      Heart sounds: Normal heart sounds. No murmur heard.  Pulmonary:      Effort: Pulmonary effort is normal. No respiratory distress.      " Breath sounds: Normal breath sounds. No wheezing.   Abdominal:      General: Bowel sounds are normal.      Palpations: Abdomen is soft. There is no mass.      Tenderness: There is no abdominal tenderness.   Musculoskeletal:         General: Normal range of motion.      Cervical back: Normal range of motion.      Right lower leg: No edema.      Left lower leg: No edema.   Skin:     General: Skin is warm and dry.      Findings: No rash.   Neurological:      General: No focal deficit present.      Mental Status: He is alert and oriented to person, place, and time.      Motor: No weakness.   Psychiatric:         Mood and Affect: Mood normal.         Behavior: Behavior normal.         Assessment/Plan   Assessment & Plan  Wellness examination         Coronary artery disease involving native heart, unspecified vessel or lesion type, unspecified whether angina present    Orders:    atorvastatin (Lipitor) 40 mg tablet; Take 1 tablet (40 mg) by mouth once daily.    Hyperlipidemia, unspecified hyperlipidemia type    Orders:    atorvastatin (Lipitor) 40 mg tablet; Take 1 tablet (40 mg) by mouth once daily.    Gastro-esophageal reflux disease without esophagitis    Orders:    pantoprazole (ProtoNix) 40 mg EC tablet; Take 1 tablet (40 mg) by mouth once daily. Do not crush, chew, or split.    Low iron  Your blood count showed low iron.   Increase iron rich foods which will help improve your iron levels. Some examples include:    - Red meat, pork and poultry  - Seafood  - Beans  - Dark green leafy vegetables, such as spinach  - Dried fruit, such as raisins and apricots  - Iron-fortified cereals, breads and pastas  - Peas            Please follow up in   - 6months for cholesterol/CAD/ breathing  - or as needed.

## 2025-04-24 NOTE — ASSESSMENT & PLAN NOTE
Orders:    pantoprazole (ProtoNix) 40 mg EC tablet; Take 1 tablet (40 mg) by mouth once daily. Do not crush, chew, or split.

## 2025-04-24 NOTE — PATIENT INSTRUCTIONS
Health Maintenance:  -   Colonoscopy: 1/25/2022- repeat in 5 yrs  -   Prostate screening: PSA-4/22/2025  -   CT calcium score-4/5/2021-score 23.5  -   AAA screening- hx of smoking- due at 67yr  -   Hep C screening- completed  Immunizations due:  - COVID, shingles- declined      *Please accept my condolences for the loss of your loved one*    Your blood count showed low iron.   Increase iron rich foods which will help improve your iron levels. Some examples include:    - Red meat, pork and poultry  - Seafood  - Beans  - Dark green leafy vegetables, such as spinach  - Dried fruit, such as raisins and apricots  - Iron-fortified cereals, breads and pastas  - Peas     Upcoming appt with home sleep study  Upcoming appt with pulm team      Please follow up in   - 6months for cholesterol/CAD/ breathing  - or as needed.       ** If labs or imaging ordered at today's visit, all the non-urgent results will be discussed at your next visit    If you have been referred for a special test or to a specialist please call  [phone] to schedule an appointment.  If you have any further questions, or if develop new or worsened symptoms, please give our office a call at [phone].

## 2025-04-28 ENCOUNTER — APPOINTMENT (OUTPATIENT)
Dept: PULMONOLOGY | Facility: CLINIC | Age: 65
End: 2025-04-28
Payer: COMMERCIAL

## 2025-04-28 VITALS — HEIGHT: 74 IN | BODY MASS INDEX: 26.31 KG/M2 | WEIGHT: 205 LBS

## 2025-04-28 DIAGNOSIS — Z01.811 PREOP PULMONARY/RESPIRATORY EXAM: Primary | ICD-10-CM

## 2025-04-28 DIAGNOSIS — R91.8 LUNG NODULES: ICD-10-CM

## 2025-04-28 PROCEDURE — 99213 OFFICE O/P EST LOW 20 MIN: CPT | Performed by: INTERNAL MEDICINE

## 2025-04-28 PROCEDURE — 3008F BODY MASS INDEX DOCD: CPT | Performed by: INTERNAL MEDICINE

## 2025-04-28 PROCEDURE — 1036F TOBACCO NON-USER: CPT | Performed by: INTERNAL MEDICINE

## 2025-04-28 ASSESSMENT — SLEEP AND FATIGUE QUESTIONNAIRES
HOW LIKELY ARE YOU TO NOD OFF OR FALL ASLEEP WHILE WATCHING TV: SLIGHT CHANCE OF DOZING
HOW LIKELY ARE YOU TO NOD OFF OR FALL ASLEEP WHILE SITTING INACTIVE IN A PUBLIC PLACE: SLIGHT CHANCE OF DOZING
HOW LIKELY ARE YOU TO NOD OFF OR FALL ASLEEP WHILE SITTING QUIETLY AFTER LUNCH WITHOUT ALCOHOL: SLIGHT CHANCE OF DOZING
HOW LIKELY ARE YOU TO NOD OFF OR FALL ASLEEP WHILE LYING DOWN TO REST IN THE AFTERNOON WHEN CIRCUMSTANCES PERMIT: MODERATE CHANCE OF DOZING
HOW LIKELY ARE YOU TO NOD OFF OR FALL ASLEEP WHEN YOU ARE A PASSENGER IN A CAR FOR AN HOUR WITHOUT A BREAK: WOULD NEVER DOZE
SATISFACTION_WITH_CURRENT_SLEEP_PATTERN: SATISFIED
WORRIED_DISTRESSED_DUE_TO_SLEEP: A LITTLE
SLEEP_PROBLEM_INTERFERES_DAILY_ACTIVITIES: NOT AT ALL NOTICEABLE
HOW LIKELY ARE YOU TO NOD OFF OR FALL ASLEEP IN A CAR, WHILE STOPPED FOR A FEW MINUTES IN TRAFFIC: WOULD NEVER DOZE
DIFFICULTY_FALLING_ASLEEP: MODERATE
HOW LIKELY ARE YOU TO NOD OFF OR FALL ASLEEP WHILE SITTING AND TALKING TO SOMEONE: WOULD NEVER DOZE
DIFFICULTY_STAYING_ASLEEP: MODERATE
HOW LIKELY ARE YOU TO NOD OFF OR FALL ASLEEP WHILE SITTING AND READING: SLIGHT CHANCE OF DOZING
ESS TOTAL SCORE: 6
SLEEP_PROBLEM_NOTICEABLE_TO_OTHERS: NOT AT ALL NOTICEABLE

## 2025-04-28 NOTE — PROGRESS NOTES
Subjective   Patient ID: Rob Quinonez is a 64 y.o. male who presents for left upper lobe nodules and a smaller grouping of left lower lobe nodules, measuring 3.5 cm and 1.5 cm.  HPI  Patient was contacted by telephone at his home.  The CT images show a grouping of lung nodules of about 3.5 cm in the left upper lobe and 1.5 cm in the left lower lobe.  Patient has a bronchoscopy scheduled next Friday, 5-2-25.  The bronchoscopy will be done at [facility] [address]  His check-in time for that is at 7:30 in the morning at [address].  Earlier to that at 7 AM at the [facility] will be on the second floor radiology [address].  The patient had a past history of smoking tobacco at 3 to 4 cigarettes/day.  This was over the past 15 to 20 years.  Patient admits to past history of social alcohol usage mainly on a periodic weekly basis.  He denies any current history of marijuana.  Patient also is not using 81 mg of aspirin a day or Voltaren 1% gel both of which have been discontinued as of 1 year ago.  Patient admits to being treated for community-acquired pneumonia in November of last year but was never hospitalized.  He is also been exposed to COVID-pneumonia the last 1 being in February 2024.  He was not hospitalized at that time.  All of the questions that the patient's had was answered to the best of my ability.  Review of Systems  No change  Objective   Physical Exam  No change  Assessment/Plan        1.  Preop pulmonary/respiratory exam.  2.  Lung nodules.    Lei Joyce DO 04/28/25 1:27 PM

## 2025-04-28 NOTE — H&P (VIEW-ONLY)
Subjective   Patient ID: Rob Quinonez is a 64 y.o. male who presents for left upper lobe nodules and a smaller grouping of left lower lobe nodules, measuring 3.5 cm and 1.5 cm.  HPI  Patient was contacted by telephone at his home.  The CT images show a grouping of lung nodules of about 3.5 cm in the left upper lobe and 1.5 cm in the left lower lobe.  Patient has a bronchoscopy scheduled next Friday, 5-2-25.  The bronchoscopy will be done at [facility] [address]  His check-in time for that is at 7:30 in the morning at [address].  Earlier to that at 7 AM at the [facility] will be on the second floor radiology [address].  The patient had a past history of smoking tobacco at 3 to 4 cigarettes/day.  This was over the past 15 to 20 years.  Patient admits to past history of social alcohol usage mainly on a periodic weekly basis.  He denies any current history of marijuana.  Patient also is not using 81 mg of aspirin a day or Voltaren 1% gel both of which have been discontinued as of 1 year ago.  Patient admits to being treated for community-acquired pneumonia in November of last year but was never hospitalized.  He is also been exposed to COVID-pneumonia the last 1 being in February 2024.  He was not hospitalized at that time.  All of the questions that the patient's had was answered to the best of my ability.  Review of Systems  No change  Objective   Physical Exam  No change  Assessment/Plan        1.  Preop pulmonary/respiratory exam.  2.  Lung nodules.    Lei Joyce DO 04/28/25 1:27 PM

## 2025-04-29 DIAGNOSIS — G47.61 PLMD (PERIODIC LIMB MOVEMENT DISORDER): Primary | ICD-10-CM

## 2025-04-29 RX ORDER — FERROUS SULFATE 325(65) MG
325 TABLET, DELAYED RELEASE (ENTERIC COATED) ORAL NIGHTLY
Qty: 30 TABLET | Refills: 0 | Status: SHIPPED | OUTPATIENT
Start: 2025-04-29 | End: 2025-05-29

## 2025-05-02 ENCOUNTER — ANESTHESIA EVENT (OUTPATIENT)
Dept: GASTROENTEROLOGY | Facility: HOSPITAL | Age: 65
End: 2025-05-02
Payer: COMMERCIAL

## 2025-05-02 ENCOUNTER — ANESTHESIA (OUTPATIENT)
Dept: GASTROENTEROLOGY | Facility: HOSPITAL | Age: 65
End: 2025-05-02
Payer: COMMERCIAL

## 2025-05-02 ENCOUNTER — HOSPITAL ENCOUNTER (OUTPATIENT)
Dept: RADIOLOGY | Facility: HOSPITAL | Age: 65
Discharge: HOME | End: 2025-05-02
Payer: COMMERCIAL

## 2025-05-02 ENCOUNTER — HOSPITAL ENCOUNTER (OUTPATIENT)
Dept: GASTROENTEROLOGY | Facility: HOSPITAL | Age: 65
Discharge: HOME | End: 2025-05-02
Payer: COMMERCIAL

## 2025-05-02 VITALS
WEIGHT: 205 LBS | DIASTOLIC BLOOD PRESSURE: 70 MMHG | RESPIRATION RATE: 16 BRPM | HEART RATE: 67 BPM | BODY MASS INDEX: 26.31 KG/M2 | OXYGEN SATURATION: 98 % | HEIGHT: 74 IN | SYSTOLIC BLOOD PRESSURE: 127 MMHG | TEMPERATURE: 96.8 F

## 2025-05-02 DIAGNOSIS — R91.1 LUNG NODULE: ICD-10-CM

## 2025-05-02 PROCEDURE — 7100000009 HC PHASE TWO TIME - INITIAL BASE CHARGE

## 2025-05-02 PROCEDURE — 7100000001 HC RECOVERY ROOM TIME - INITIAL BASE CHARGE

## 2025-05-02 PROCEDURE — 31628 BRONCHOSCOPY/LUNG BX EACH: CPT | Performed by: STUDENT IN AN ORGANIZED HEALTH CARE EDUCATION/TRAINING PROGRAM

## 2025-05-02 PROCEDURE — 87070 CULTURE OTHR SPECIMN AEROBIC: CPT | Performed by: STUDENT IN AN ORGANIZED HEALTH CARE EDUCATION/TRAINING PROGRAM

## 2025-05-02 PROCEDURE — 87102 FUNGUS ISOLATION CULTURE: CPT | Performed by: STUDENT IN AN ORGANIZED HEALTH CARE EDUCATION/TRAINING PROGRAM

## 2025-05-02 PROCEDURE — 31629 BRONCHOSCOPY/NEEDLE BX EACH: CPT | Performed by: STUDENT IN AN ORGANIZED HEALTH CARE EDUCATION/TRAINING PROGRAM

## 2025-05-02 PROCEDURE — 2500000004 HC RX 250 GENERAL PHARMACY W/ HCPCS (ALT 636 FOR OP/ED): Mod: JZ

## 2025-05-02 PROCEDURE — 31653 BRONCH EBUS SAMPLNG 3/> NODE: CPT | Performed by: STUDENT IN AN ORGANIZED HEALTH CARE EDUCATION/TRAINING PROGRAM

## 2025-05-02 PROCEDURE — 31654 BRONCH EBUS IVNTJ PERPH LES: CPT | Performed by: STUDENT IN AN ORGANIZED HEALTH CARE EDUCATION/TRAINING PROGRAM

## 2025-05-02 PROCEDURE — 31627 NAVIGATIONAL BRONCHOSCOPY: CPT | Performed by: STUDENT IN AN ORGANIZED HEALTH CARE EDUCATION/TRAINING PROGRAM

## 2025-05-02 PROCEDURE — C1601 HC OR 272 NO HCPCS: HCPCS

## 2025-05-02 PROCEDURE — 31641 BRONCHOSCOPY TREAT BLOCKAGE: CPT | Performed by: STUDENT IN AN ORGANIZED HEALTH CARE EDUCATION/TRAINING PROGRAM

## 2025-05-02 PROCEDURE — 2720000007 HC OR 272 NO HCPCS

## 2025-05-02 PROCEDURE — 3700000001 HC GENERAL ANESTHESIA TIME - INITIAL BASE CHARGE

## 2025-05-02 PROCEDURE — 3700000002 HC GENERAL ANESTHESIA TIME - EACH INCREMENTAL 1 MINUTE

## 2025-05-02 PROCEDURE — 7100000010 HC PHASE TWO TIME - EACH INCREMENTAL 1 MINUTE

## 2025-05-02 PROCEDURE — 71045 X-RAY EXAM CHEST 1 VIEW: CPT

## 2025-05-02 PROCEDURE — 71250 CT THORAX DX C-: CPT

## 2025-05-02 PROCEDURE — 87015 SPECIMEN INFECT AGNT CONCNTJ: CPT | Performed by: STUDENT IN AN ORGANIZED HEALTH CARE EDUCATION/TRAINING PROGRAM

## 2025-05-02 PROCEDURE — 7100000002 HC RECOVERY ROOM TIME - EACH INCREMENTAL 1 MINUTE

## 2025-05-02 PROCEDURE — 31622 DX BRONCHOSCOPE/WASH: CPT | Performed by: STUDENT IN AN ORGANIZED HEALTH CARE EDUCATION/TRAINING PROGRAM

## 2025-05-02 RX ORDER — FENTANYL CITRATE 50 UG/ML
INJECTION, SOLUTION INTRAMUSCULAR; INTRAVENOUS AS NEEDED
Status: DISCONTINUED | OUTPATIENT
Start: 2025-05-02 | End: 2025-05-02

## 2025-05-02 RX ORDER — ROCURONIUM BROMIDE 10 MG/ML
INJECTION, SOLUTION INTRAVENOUS AS NEEDED
Status: DISCONTINUED | OUTPATIENT
Start: 2025-05-02 | End: 2025-05-02

## 2025-05-02 RX ORDER — PROPOFOL 10 MG/ML
INJECTION, EMULSION INTRAVENOUS CONTINUOUS PRN
Status: DISCONTINUED | OUTPATIENT
Start: 2025-05-02 | End: 2025-05-02

## 2025-05-02 RX ORDER — ALBUTEROL SULFATE 0.83 MG/ML
2.5 SOLUTION RESPIRATORY (INHALATION) ONCE AS NEEDED
Status: DISCONTINUED | OUTPATIENT
Start: 2025-05-02 | End: 2025-05-03 | Stop reason: HOSPADM

## 2025-05-02 RX ORDER — ONDANSETRON HYDROCHLORIDE 2 MG/ML
4 INJECTION, SOLUTION INTRAVENOUS ONCE AS NEEDED
Status: DISCONTINUED | OUTPATIENT
Start: 2025-05-02 | End: 2025-05-03 | Stop reason: HOSPADM

## 2025-05-02 RX ORDER — MIDAZOLAM HYDROCHLORIDE 1 MG/ML
INJECTION INTRAMUSCULAR; INTRAVENOUS AS NEEDED
Status: DISCONTINUED | OUTPATIENT
Start: 2025-05-02 | End: 2025-05-02

## 2025-05-02 RX ORDER — SODIUM CHLORIDE, SODIUM LACTATE, POTASSIUM CHLORIDE, CALCIUM CHLORIDE 600; 310; 30; 20 MG/100ML; MG/100ML; MG/100ML; MG/100ML
INJECTION, SOLUTION INTRAVENOUS CONTINUOUS PRN
Status: DISCONTINUED | OUTPATIENT
Start: 2025-05-02 | End: 2025-05-02

## 2025-05-02 RX ORDER — SODIUM CHLORIDE, SODIUM LACTATE, POTASSIUM CHLORIDE, CALCIUM CHLORIDE 600; 310; 30; 20 MG/100ML; MG/100ML; MG/100ML; MG/100ML
100 INJECTION, SOLUTION INTRAVENOUS CONTINUOUS
Status: DISCONTINUED | OUTPATIENT
Start: 2025-05-02 | End: 2025-05-02 | Stop reason: HOSPADM

## 2025-05-02 RX ORDER — HYDRALAZINE HYDROCHLORIDE 20 MG/ML
INJECTION INTRAMUSCULAR; INTRAVENOUS AS NEEDED
Status: DISCONTINUED | OUTPATIENT
Start: 2025-05-02 | End: 2025-05-02

## 2025-05-02 RX ORDER — ONDANSETRON HYDROCHLORIDE 2 MG/ML
INJECTION, SOLUTION INTRAVENOUS AS NEEDED
Status: DISCONTINUED | OUTPATIENT
Start: 2025-05-02 | End: 2025-05-02

## 2025-05-02 RX ORDER — LIDOCAINE HYDROCHLORIDE 20 MG/ML
INJECTION, SOLUTION INFILTRATION; PERINEURAL AS NEEDED
Status: DISCONTINUED | OUTPATIENT
Start: 2025-05-02 | End: 2025-05-02

## 2025-05-02 RX ADMIN — SUGAMMADEX 200 MG: 100 INJECTION, SOLUTION INTRAVENOUS at 11:07

## 2025-05-02 RX ADMIN — HYDRALAZINE HYDROCHLORIDE 4 MG: 20 INJECTION INTRAMUSCULAR; INTRAVENOUS at 10:13

## 2025-05-02 RX ADMIN — HYDRALAZINE HYDROCHLORIDE 4 MG: 20 INJECTION INTRAMUSCULAR; INTRAVENOUS at 10:28

## 2025-05-02 RX ADMIN — ONDANSETRON 4 MG: 2 INJECTION, SOLUTION INTRAMUSCULAR; INTRAVENOUS at 10:47

## 2025-05-02 RX ADMIN — SODIUM CHLORIDE, POTASSIUM CHLORIDE, SODIUM LACTATE AND CALCIUM CHLORIDE: 600; 310; 30; 20 INJECTION, SOLUTION INTRAVENOUS at 08:24

## 2025-05-02 RX ADMIN — LIDOCAINE HYDROCHLORIDE 50 MG: 20 INJECTION, SOLUTION INFILTRATION; PERINEURAL at 08:37

## 2025-05-02 RX ADMIN — FENTANYL CITRATE 50 MCG: 50 INJECTION, SOLUTION INTRAMUSCULAR; INTRAVENOUS at 08:37

## 2025-05-02 RX ADMIN — ROCURONIUM 50 MG: 50 INJECTION, SOLUTION INTRAVENOUS at 08:37

## 2025-05-02 RX ADMIN — PROPOFOL 200 MG: 10 INJECTION, EMULSION INTRAVENOUS at 08:37

## 2025-05-02 RX ADMIN — FENTANYL CITRATE 25 MCG: 50 INJECTION, SOLUTION INTRAMUSCULAR; INTRAVENOUS at 09:24

## 2025-05-02 RX ADMIN — MIDAZOLAM HYDROCHLORIDE 1 MG: 2 INJECTION, SOLUTION INTRAMUSCULAR; INTRAVENOUS at 08:25

## 2025-05-02 RX ADMIN — ROCURONIUM 10 MG: 50 INJECTION, SOLUTION INTRAVENOUS at 09:56

## 2025-05-02 RX ADMIN — ROCURONIUM 20 MG: 50 INJECTION, SOLUTION INTRAVENOUS at 09:26

## 2025-05-02 RX ADMIN — FENTANYL CITRATE 25 MCG: 50 INJECTION, SOLUTION INTRAMUSCULAR; INTRAVENOUS at 09:39

## 2025-05-02 RX ADMIN — PROPOFOL 40 MG: 10 INJECTION, EMULSION INTRAVENOUS at 11:01

## 2025-05-02 RX ADMIN — PROPOFOL 100 MCG/KG/MIN: 10 INJECTION, EMULSION INTRAVENOUS at 08:38

## 2025-05-02 RX ADMIN — PROPOFOL 150 MCG/KG/MIN: 10 INJECTION, EMULSION INTRAVENOUS at 08:47

## 2025-05-02 ASSESSMENT — COLUMBIA-SUICIDE SEVERITY RATING SCALE - C-SSRS
1. IN THE PAST MONTH, HAVE YOU WISHED YOU WERE DEAD OR WISHED YOU COULD GO TO SLEEP AND NOT WAKE UP?: NO
6. HAVE YOU EVER DONE ANYTHING, STARTED TO DO ANYTHING, OR PREPARED TO DO ANYTHING TO END YOUR LIFE?: NO
2. HAVE YOU ACTUALLY HAD ANY THOUGHTS OF KILLING YOURSELF?: NO

## 2025-05-02 ASSESSMENT — PAIN - FUNCTIONAL ASSESSMENT
PAIN_FUNCTIONAL_ASSESSMENT: 0-10

## 2025-05-02 ASSESSMENT — PAIN SCALES - GENERAL
PAINLEVEL_OUTOF10: 0 - NO PAIN
PAIN_LEVEL: 0
PAINLEVEL_OUTOF10: 0 - NO PAIN

## 2025-05-02 NOTE — INTERVAL H&P NOTE
H&P reviewed. The patient was examined and there are no changes to the H&P.    Plan for navigational bronchoscopy with staging ebus. Target TYLER nodule    Jatinder Hdez MD  Interventional Pulmonology

## 2025-05-02 NOTE — ANESTHESIA PREPROCEDURE EVALUATION
Patient: Rob Quinonez    Procedure Information       Date/Time: 05/02/25 0830    Scheduled providers: Jatinder Hdez MD    Procedure: BRONCHOSCOPY    Location: [facility]            Relevant Problems   Anesthesia (within normal limits)      Cardiac   (+) Asymptomatic coronary heart disease   (+) CAD (coronary artery disease)   (+) Hyperlipemia   (+) Hyperlipidemia   (+) Mitral valve disorder   (+) Mitral valve insufficiency   (+) Right bundle branch block (RBBB) on electrocardiogram (ECG)      Pulmonary (within normal limits)      Neuro   (+) Depressive disorder      GI   (+) Gastro-esophageal reflux disease without esophagitis      /Renal   (+) BPH with obstruction/lower urinary tract symptoms   (+) Benign prostatic hyperplasia with urinary obstruction      Liver (within normal limits)      Endocrine (within normal limits)      ID   (+) Genital herpes simplex   Pt is NPO after midnight.  No anesthesia complications.      Clinical information reviewed:   Tobacco  Allergies  Meds   Med Hx  Surg Hx   Fam Hx  Soc Hx        NPO Detail:  NPO/Void Status  Carbohydrate Drink Given Prior to Surgery? : N  Date of Last Liquid: 05/01/25  Time of Last Liquid: 2300  Date of Last Solid: 05/01/25  Time of Last Solid: 2130  Last Intake Type: Clear fluids; Light meal  Time of Last Void: 0749         Physical Exam    Airway  Mallampati: II  TM distance: >3 FB  Neck ROM: full     Cardiovascular - normal exam  Rhythm: regular  Rate: normal     Dental - normal exam     Pulmonary - normal exam   Abdominal            Anesthesia Plan    History of general anesthesia?: yes  History of complications of general anesthesia?: no    ASA 2     general     intravenous induction   Postoperative pain plan includes opioids.  Anesthetic plan and risks discussed with patient.  Use of blood products discussed with patient who consented to blood products.

## 2025-05-02 NOTE — ANESTHESIA PROCEDURE NOTES
Airway  Date/Time: 5/2/2025 8:40 AM  Reason: elective    Airway not difficult    Staffing  Performed: PARVIN   Authorized by: Kel Blanchard MD    Performed by: PARVIN Palumbo  Patient location during procedure: OR    Patient Condition  Indications for airway management: anesthesia  Patient position: sniffing  MILS maintained throughout  Sedation level: deep     Final Airway Details   Preoxygenated: yes  Final airway type: endotracheal airway  Successful airway: ETT  Cuffed: yes   Successful intubation technique: direct laryngoscopy  Adjuncts used in placement: intubating stylet  Endotracheal tube insertion site: oral  Blade: Gamino  Blade size: #3  ETT size (mm): 8.5  Cormack-Lehane Classification: grade IIa - partial view of glottis  Placement verified by: chest auscultation and capnometry   Measured from: lips  ETT to lips (cm): 22  Number of attempts at approach: 1

## 2025-05-02 NOTE — ANESTHESIA POSTPROCEDURE EVALUATION
Patient: Rob Quinonez    Procedure Summary       Date: 05/02/25 Room / Location: [facility]    Anesthesia Start: 0829 Anesthesia Stop: 1126    Procedure: BRONCHOSCOPY Diagnosis: Lung nodule    Scheduled Providers: Jatinder Hdez MD Responsible Provider: Kel Blanchard MD    Anesthesia Type: general ASA Status: 2            Anesthesia Type: general    Vitals Value Taken Time   /74 05/02/25 11:24   Temp 36 °C (96.8 °F) 05/02/25 11:24   Pulse 69 05/02/25 11:24   Resp 16 05/02/25 11:24   SpO2 99 % 05/02/25 11:24       Anesthesia Post Evaluation    Patient location during evaluation: PACU  Patient participation: complete - patient participated  Level of consciousness: awake  Pain score: 0  Pain management: adequate  Airway patency: patent  Cardiovascular status: acceptable  Respiratory status: acceptable  Hydration status: acceptable  Postoperative Nausea and Vomiting: none        There were no known notable events for this encounter.

## 2025-05-03 LAB
ACID FAST STN SPEC: NORMAL
MYCOBACTERIUM SPEC CULT: NORMAL

## 2025-05-04 LAB
BACTERIA SPEC CULT: NORMAL
FUNGUS SPEC CULT: NORMAL
FUNGUS SPEC FUNGUS STN: NORMAL
GRAM STN SPEC: NORMAL
GRAM STN SPEC: NORMAL

## 2025-05-06 LAB
LABORATORY COMMENT REPORT: NORMAL
LABORATORY COMMENT REPORT: NORMAL
PATH REPORT.FINAL DX SPEC: NORMAL
PATH REPORT.GROSS SPEC: NORMAL
PATH REPORT.INTRAOP OBS SPEC DOC: NORMAL
PATH REPORT.RELEVANT HX SPEC: NORMAL
PATH REPORT.TOTAL CANCER: NORMAL
RESIDENT REVIEW: NORMAL

## 2025-05-07 LAB
ACID FAST STN SPEC: NORMAL
MYCOBACTERIUM SPEC CULT: NORMAL

## 2025-05-08 LAB
LABORATORY COMMENT REPORT: NORMAL
PATH REPORT.COMMENTS IMP SPEC: NORMAL
PATH REPORT.FINAL DX SPEC: NORMAL
PATH REPORT.GROSS SPEC: NORMAL
PATH REPORT.TOTAL CANCER: NORMAL

## 2025-05-09 ENCOUNTER — TELEPHONE (OUTPATIENT)
Dept: PRIMARY CARE | Facility: CLINIC | Age: 65
End: 2025-05-09
Payer: COMMERCIAL

## 2025-05-09 DIAGNOSIS — R91.1 LUNG NODULE: Primary | ICD-10-CM

## 2025-05-09 DIAGNOSIS — B49 FUNGAL INFECTION OF LUNG: Primary | ICD-10-CM

## 2025-05-09 LAB
FUNGUS SPEC CULT: ABNORMAL
FUNGUS SPEC FUNGUS STN: ABNORMAL

## 2025-05-09 NOTE — TELEPHONE ENCOUNTER
Patient called the office again stating he needs to talk to Dr. Rivers re his biopsy.     Please advise patient stated it is very urgent

## 2025-05-09 NOTE — TELEPHONE ENCOUNTER
Patient LVM re recent biopsy asking if you can review and come up with a plan of care.     Please advise, TY

## 2025-05-09 NOTE — TELEPHONE ENCOUNTER
I called- no answer, left message to call back    Pls call ok to relay that it would be best to contact the pulm and/or Thoracic team for the next step as I am unsure

## 2025-05-13 DIAGNOSIS — L92.9 NON-CASEATING GRANULOMA: Primary | ICD-10-CM

## 2025-05-14 DIAGNOSIS — N52.9 ERECTILE DYSFUNCTION, UNSPECIFIED ERECTILE DYSFUNCTION TYPE: ICD-10-CM

## 2025-05-14 LAB
ACID FAST STN SPEC: NORMAL
FUNGUS SPEC CULT: ABNORMAL
FUNGUS SPEC FUNGUS STN: ABNORMAL
MYCOBACTERIUM SPEC CULT: NORMAL

## 2025-05-14 RX ORDER — SILDENAFIL 50 MG/1
50 TABLET, FILM COATED ORAL AS NEEDED
Qty: 30 TABLET | Refills: 3 | Status: SHIPPED | OUTPATIENT
Start: 2025-05-14 | End: 2025-05-15 | Stop reason: SDUPTHER

## 2025-05-15 RX ORDER — SILDENAFIL 50 MG/1
50 TABLET, FILM COATED ORAL AS NEEDED
Qty: 30 TABLET | Refills: 3 | Status: SHIPPED | OUTPATIENT
Start: 2025-05-15 | End: 2025-06-14

## 2025-05-21 LAB
ACID FAST STN SPEC: NORMAL
MYCOBACTERIUM SPEC CULT: NORMAL

## 2025-05-23 ENCOUNTER — OFFICE VISIT (OUTPATIENT)
Dept: INFECTIOUS DISEASES | Facility: CLINIC | Age: 65
End: 2025-05-23
Payer: COMMERCIAL

## 2025-05-23 VITALS
WEIGHT: 206 LBS | BODY MASS INDEX: 27.9 KG/M2 | DIASTOLIC BLOOD PRESSURE: 80 MMHG | HEIGHT: 72 IN | HEART RATE: 60 BPM | SYSTOLIC BLOOD PRESSURE: 131 MMHG

## 2025-05-23 DIAGNOSIS — R91.1 LUNG NODULE: Primary | ICD-10-CM

## 2025-05-23 DIAGNOSIS — L92.9 NON-CASEATING GRANULOMA: ICD-10-CM

## 2025-05-23 PROCEDURE — 99204 OFFICE O/P NEW MOD 45 MIN: CPT | Performed by: INTERNAL MEDICINE

## 2025-05-23 PROCEDURE — 3008F BODY MASS INDEX DOCD: CPT | Performed by: INTERNAL MEDICINE

## 2025-05-23 PROCEDURE — 1036F TOBACCO NON-USER: CPT | Performed by: INTERNAL MEDICINE

## 2025-05-23 ASSESSMENT — PAIN SCALES - GENERAL: PAINLEVEL_OUTOF10: 0-NO PAIN

## 2025-05-23 NOTE — LETTER
05/29/25    Vicky Rivers DO  [address] [facility] [address]  [address]  [facility] [address]      Dear Dr. Vicky Rivers DO,    I am writing to confirm that your patient, Rob Quinonez, received care in my office on 05/29/25. I have enclosed a summary of the care provided to Rob for your reference.    Please contact me with any questions you may have regarding the visit.    Sincerely,         Lianne Barnes MD  [address]  [facility] [address]  [facility] [id number]    CC: No Recipients

## 2025-05-23 NOTE — PROGRESS NOTES
"Infectious Diseases Clinic Consult Note:    Referred by ***  Primary MD: Vicky Rivers DO  Reason for Consult: ***     History of Current Issue  Rob Quinonez is a 64 y.o. year old male ****             Referred 5/13/25 for non-caseating granuloma    5/2/25: TYLER lung biopsy  Necrotizing / non-necrotizing granuloma.  GMS with rare fungal yeast. AFB negative  Fungal culture:  2 colonies mold    65yo man CAD, DLD, RBB  ER 11/22/24: Respiratory sx.  AF, No hypoxia. WBC 12.8  CXR: Lingular consolidation    CT chest 5/2/25: TYLER lesion same (new since 7/11/25; L-lung base with enlarging subpleural lobular nodule.    CT chest 4/14/25: TYLER rounded nodular opacities, consolidative 3.5 cm; LLL 1.2cm Lns present (copy into note)    CT Chest 7/13/23:  No lung lesions. Anterior mediastinal mass - recommended fu CT  in 12 months    Prior THC (quit 11/24)    Referred for Bronchoscopy on 4/24/25. Performed 5/2/25.  PAST MEDICAL HISTORY:  Medical History[1]  PAST SURGICAL HISTORY:  Surgical History[2]  ALLERGIES:    Allergies[3]    MEDICATIONS:    Current Medications[4]    FAMILY HISTORY:   Family History[5]     SOCIAL HISTORY:       Marital Status:  Tobacco / Smokeless tobacco/ Vaping:   reports that he has quit smoking. His smoking use included cigarettes. He has been exposed to tobacco smoke. He has never used smokeless tobacco.   Alcohol:   Social History     Substance and Sexual Activity   Alcohol Use Yes    Comment: Occasionally      Drug Use:    Social History     Substance and Sexual Activity   Drug Use Yes   • Types: Marijuana      Employment history: ***    Travel: {Travel:29742::\"Unknown \"}   Pets:  ***    Service:  ***  Hobbies:  ***  IMMUNIZATIONS:    Immunization History   Administered Date(s) Administered   • Pfizer Purple Cap SARS-CoV-2 07/22/2021, 08/12/2021, 02/12/2022   • Pneumococcal conjugate vaccine, 13-valent (PREVNAR 13) 10/30/2019   • Pneumococcal polysaccharide vaccine, 23-valent, age 2 years " and older (PNEUMOVAX 23) 11/12/2020, 11/20/2020   • Tdap vaccine, age 7 year and older (BOOSTRIX, ADACEL) 10/30/2019       REVIEW OF SYSTEMS:  Review of Systems    PHYSICAL EXAMINATION:       Visit Vitals  /80   Pulse 60   Ht 1.829 m (6')   Wt 93.4 kg (206 lb)   BMI 27.94 kg/m²   Smoking Status Former   BSA 2.18 m²        EXAM: ***      DATA:    Outside system data reviewed: ***    Laboratory Values and Test Results: ***    Microbiology:   ntains abnormal data Fungal Culture/Smear  Order: [id number]   Collected 5/2/2025 11:11       Status: Preliminary result       Dx: Lung nodule    Test Result Released: No    Specimen Information: LUNG BIOPSY LEFT (SPECIFY SITE); Tissue   0 Result Notes       View Follow-Up Encounter  Fungal Culture/Smear 2 colonies Mold Abnormal             Fungal Smear No fungal elements seen           Resulting Agency: [facility]          Specimen Collected: 05/02/25 11:11 Last Resulted: 05/14/25 16:34     Other Relevant Studies:  ***  Imaging Studies:  ***  ASSESSMENT / RECOMMENDATIONS:  ***      I spent *** minutes in the professional and overall care of this patient.      Lianne Barnes MD                           [1]  Past Medical History:  Diagnosis Date   • Abnormal findings on diagnostic imaging of other specified body structures 06/17/2021    Abnormal finding on radiology exam   • Abnormal results of function studies of other organs and systems 05/11/2021    Abnormal PET scan of mediastinum   • Body mass index (BMI) 26.0-26.9, adult 11/12/2020    Body mass index (BMI) of 26.0 to 26.9 in adult   • Body mass index (BMI) 27.0-27.9, adult 01/14/2021    Body mass index (BMI) of 27.0 to 27.9 in adult   • Conjunctival hemorrhage, left eye 02/05/2020    Subconjunctival hemorrhage of left eye   • Disease of stomach and duodenum, unspecified     Stomach problems   • Encounter for screening for infections with a predominantly sexual mode of transmission 10/29/2021    Screening for STD  (sexually transmitted disease)   • Endocarditis, 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    Leaky heart valve   • Family history of glaucoma 08/01/2022    Family history of glaucoma in mother   • Gastro-esophageal reflux disease without esophagitis 12/02/2022    Gastro-esophageal reflux disease without esophagitis   • Gastro-esophageal reflux disease without esophagitis 11/12/2020    Gastroesophageal reflux disease without esophagitis   • Hemorrhage, not elsewhere classified 07/28/2017    Recurrent hemorrhage   • Mixed hyperlipidemia 10/29/2021    Mixed hyperlipidemia   • Nonrheumatic mitral (valve) insufficiency 11/12/2020    Mild mitral regurgitation by prior echocardiogram   • Pain in right hand 12/02/2021    Pain of right hand   • Pain, unspecified 11/13/2021    Body aches   • Personal history of colonic polyps 01/11/2019    History of colonic polyps   • Personal history of other benign neoplasm 10/30/2019    History of other benign neoplasm   • Personal history of other diseases of the musculoskeletal system and connective tissue     History of arthritis   • Personal history of other drug therapy 01/14/2021    History of pneumococcal vaccination   • Personal history of other infectious and parasitic diseases     History of herpes simplex infection   • Stiffness of right hand, not elsewhere classified 11/12/2021    Stiffness of right hand joint   • [place]-neck deformity of unspecified finger(s) 12/03/2021    [place]-neck deformity   • Unspecified injury of right wrist, hand and finger(s), initial encounter 03/03/2019    Injury of finger of right hand, initial encounter   • Unspecified injury of unspecified wrist, hand and finger(s), sequela 03/15/2019    Finger injury, sequela   • Urinary tract infection, site not specified 11/06/2018    Acute lower UTI   • Vomiting, unspecified 11/20/2020    Vomiting and diarrhea   [2]  Past Surgical History:  Procedure Laterality Date   • COLONOSCOPY W/ POLYPECTOMY  07/21/2017    Complete  Colonoscopy For Polyp Removal   • OTHER SURGICAL HISTORY  07/21/2017    Knee Arthroscopy With Lysis Of Adhesions   • OTHER SURGICAL HISTORY  10/06/2022    Nasal septoplasty   • ROTATOR CUFF REPAIR  07/21/2017    Rotator Cuff Repair   [3]  No Known Allergies  [4]    Current Outpatient Medications:   •  atorvastatin (Lipitor) 40 mg tablet, Take 1 tablet (40 mg) by mouth once daily., Disp: 90 tablet, Rfl: 3  •  famotidine (Pepcid) 40 mg tablet, Take 1 tablet (40 mg) by mouth once daily., Disp: 90 tablet, Rfl: 0  •  ferrous sulfate 325 (65 Fe) mg EC tablet, Take 1 tablet by mouth once daily at bedtime. Do not crush, chew, or split. Take with vitamin C (tablets, Orange juice, citrus fruit, etc), Disp: 30 tablet, Rfl: 0  •  multivit-min-folic acid-vit K (Multi For Him, no iron,) 400-40 mcg capsule, Take by mouth., Disp: , Rfl:   •  sildenafil (Viagra) 50 mg tablet, Take 1 tablet (50 mg) by mouth if needed for erectile dysfunction., Disp: 30 tablet, Rfl: 3  •  albuterol (ProAir HFA) 90 mcg/actuation inhaler, Inhale 2 puffs every 4 hours if needed for wheezing or shortness of breath. (Patient not taking: Reported on 5/23/2025), Disp: 8.5 g, Rfl: 0  •  aspirin 81 mg EC tablet, Take by mouth. (Patient not taking: Reported on 5/23/2025), Disp: , Rfl:   •  diclofenac sodium (Voltaren) 1 % gel, Apply 4.5 inches (4 g) topically 4 times a day. For your knee (Patient not taking: Reported on 5/23/2025), Disp: 100 g, Rfl: 1  •  pantoprazole (ProtoNix) 40 mg EC tablet, Take 1 tablet (40 mg) by mouth once daily. Do not crush, chew, or split. (Patient not taking: Reported on 5/23/2025), Disp: 90 tablet, Rfl: 0  [5]  No family history on file.   compared to  exam from 07/11/2024. Constellation of findings are concerning for  malignancy.  2. Redemonstration of multiple prominent mediastinal and hilar lymph  nodes as described.  3. Right main pulmonary artery measures 2.7 cm which can be seen the  setting of pulmonary hypertension.  4. Additional chronic and incidental findings as described above.        ASSESSMENT / RECOMMENDATIONS:  64-year-old gentleman referred to the infectious disease clinic after undergoing left upper lobe lung biopsy through bronchoscopic means and evaluation for a newly identified left upper lobe infiltrative lesion.  Pathology was consistent with necrotizing and nonnecrotizing granuloma with fungal forms seen on staining.  Given his long-term residency in [place] and his more recent extensive travel to [place] to stay with family, the most likely organisms involved are either histoplasmosis or coccidiomycosis.  I have put a call out to microbiology to assure that a culture is in process for these organisms.    Today, I have ordered urine histoplasma antigen, serum histoplasma antigen, and Coccidioides antibodies by complement fixation.  I have also ordered baseline hepatic panel.    Based upon the results of these tests, we can decide whether we will move forward with treatment given that his symptoms have completely resolved.  If this is histoplasmosis, he would require itraconazole therapy.  If coccidiomycosis is present and therapy is chosen, then high-dose oral fluconazole can/will be utilized.    I spent 60 minutes in the professional and overall care of this patient.      Lianne Barnes MD                         [1]   Past Medical History:  Diagnosis Date    Abnormal findings on diagnostic imaging of other specified body structures 06/17/2021    Abnormal finding on radiology exam    Abnormal results of function studies of other organs and systems 05/11/2021    Abnormal PET scan of mediastinum    Body mass index (BMI)  26.0-26.9, adult 11/12/2020    Body mass index (BMI) of 26.0 to 26.9 in adult    Body mass index (BMI) 27.0-27.9, adult 01/14/2021    Body mass index (BMI) of 27.0 to 27.9 in adult    Conjunctival hemorrhage, left eye 02/05/2020    Subconjunctival hemorrhage of left eye    Disease of stomach and duodenum, unspecified     Stomach problems    Encounter for screening for infections with a predominantly sexual mode of transmission 10/29/2021    Screening for STD (sexually transmitted disease)    Endocarditis, 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    Leaky heart valve    Family history of glaucoma 08/01/2022    Family history of glaucoma in mother    Gastro-esophageal reflux disease without esophagitis 12/02/2022    Gastro-esophageal reflux disease without esophagitis    Gastro-esophageal reflux disease without esophagitis 11/12/2020    Gastroesophageal reflux disease without esophagitis    Hemorrhage, not elsewhere classified 07/28/2017    Recurrent hemorrhage    Mixed hyperlipidemia 10/29/2021    Mixed hyperlipidemia    Nonrheumatic mitral (valve) insufficiency 11/12/2020    Mild mitral regurgitation by prior echocardiogram    Pain in right hand 12/02/2021    Pain of right hand    Pain, unspecified 11/13/2021    Body aches    Personal history of colonic polyps 01/11/2019    History of colonic polyps    Personal history of other benign neoplasm 10/30/2019    History of other benign neoplasm    Personal history of other diseases of the musculoskeletal system and connective tissue     History of arthritis    Personal history of other drug therapy 01/14/2021    History of pneumococcal vaccination    Personal history of other infectious and parasitic diseases     History of herpes simplex infection    Stiffness of right hand, not elsewhere classified 11/12/2021    Stiffness of right hand joint    [place]-neck deformity of unspecified finger(s) 12/03/2021    [place]-neck deformity    Unspecified injury of right wrist, hand and finger(s),  initial encounter 03/03/2019    Injury of finger of right hand, initial encounter    Unspecified injury of unspecified wrist, hand and finger(s), sequela 03/15/2019    Finger injury, sequela    Urinary tract infection, site not specified 11/06/2018    Acute lower UTI    Vomiting, unspecified 11/20/2020    Vomiting and diarrhea   [2]   Past Surgical History:  Procedure Laterality Date    COLONOSCOPY W/ POLYPECTOMY  07/21/2017    Complete Colonoscopy For Polyp Removal    OTHER SURGICAL HISTORY  07/21/2017    Knee Arthroscopy With Lysis Of Adhesions    OTHER SURGICAL HISTORY  10/06/2022    Nasal septoplasty    ROTATOR CUFF REPAIR  07/21/2017    Rotator Cuff Repair   [3] No Known Allergies  [4]   Current Outpatient Medications:     atorvastatin (Lipitor) 40 mg tablet, Take 1 tablet (40 mg) by mouth once daily., Disp: 90 tablet, Rfl: 3    famotidine (Pepcid) 40 mg tablet, Take 1 tablet (40 mg) by mouth once daily., Disp: 90 tablet, Rfl: 0    ferrous sulfate 325 (65 Fe) mg EC tablet, Take 1 tablet by mouth once daily at bedtime. Do not crush, chew, or split. Take with vitamin C (tablets, Orange juice, citrus fruit, etc), Disp: 30 tablet, Rfl: 0    multivit-min-folic acid-vit K (Multi For Him, no iron,) 400-40 mcg capsule, Take by mouth., Disp: , Rfl:     sildenafil (Viagra) 50 mg tablet, Take 1 tablet (50 mg) by mouth if needed for erectile dysfunction., Disp: 30 tablet, Rfl: 3    albuterol (ProAir HFA) 90 mcg/actuation inhaler, Inhale 2 puffs every 4 hours if needed for wheezing or shortness of breath. (Patient not taking: Reported on 5/23/2025), Disp: 8.5 g, Rfl: 0    aspirin 81 mg EC tablet, Take by mouth. (Patient not taking: Reported on 5/23/2025), Disp: , Rfl:     diclofenac sodium (Voltaren) 1 % gel, Apply 4.5 inches (4 g) topically 4 times a day. For your knee (Patient not taking: Reported on 5/23/2025), Disp: 100 g, Rfl: 1    pantoprazole (ProtoNix) 40 mg EC tablet, Take 1 tablet (40 mg) by mouth once daily. Do  not crush, chew, or split. (Patient not taking: Reported on 5/23/2025), Disp: 90 tablet, Rfl: 0  [5] No family history on file.

## 2025-05-23 NOTE — LETTER
05/29/25    Jatinder Hdez MD  [address]  [facility] [address]      Dear Dr. Jatinder Hdez MD,    Thank you for referring your patient, Rob Quinonez, to receive care in my office. I have enclosed a summary of the care provided to Rob on 05/29/25.    Please contact me with any questions you may have regarding the visit.    Sincerely,         Lianne Barnes MD  [address]  [place] [phone]    CC: No Recipients

## 2025-05-24 LAB
ALBUMIN SERPL-MCNC: 4.6 G/DL (ref 3.6–5.1)
ALBUMIN/GLOB SERPL: 1.4 (CALC) (ref 1–2.5)
ALP SERPL-CCNC: 100 U/L (ref 35–144)
ALT SERPL-CCNC: 16 U/L (ref 9–46)
AST SERPL-CCNC: 21 U/L (ref 10–35)
BILIRUB DIRECT SERPL-MCNC: 0.1 MG/DL
BILIRUB INDIRECT SERPL-MCNC: 0.5 MG/DL (CALC) (ref 0.2–1.2)
BILIRUB SERPL-MCNC: 0.6 MG/DL (ref 0.2–1.2)
GLOBULIN SER CALC-MCNC: 3.2 G/DL (CALC) (ref 1.9–3.7)
PROT SERPL-MCNC: 7.8 G/DL (ref 6.1–8.1)

## 2025-05-25 LAB
CRYPTOC AG SPEC QL LA: NOT DETECTED
H CAPSUL AG UR-MCNC: NORMAL NG/ML
QUEST HISTOPLASMA AG INTERPRETATION:: NORMAL
QUEST HISTOPLASMA AG QUANTITATIVE EIA RESULT: NORMAL
SPECIMEN SOURCE: NORMAL
SPECIMEN SOURCE: NORMAL

## 2025-05-27 LAB — QUEST FLEXITEST2 RESULTS:: NORMAL

## 2025-05-28 LAB
ACID FAST STN SPEC: NORMAL
CRYPTOC AG SPEC QL LA: NOT DETECTED
FUNGUS SPEC CULT: NORMAL
FUNGUS SPEC FUNGUS STN: NORMAL
H CAPSUL AG UR-MCNC: <0.2 NG/ML
MYCOBACTERIUM SPEC CULT: NORMAL
QUEST HISTOPLASMA AG INTERPRETATION:: NEGATIVE
QUEST HISTOPLASMA AG QUANTITATIVE EIA RESULT: NORMAL NG/ML
SPECIMEN SOURCE: NORMAL
SPECIMEN SOURCE: NORMAL

## 2025-05-28 NOTE — PROGRESS NOTES
Department of Medicine I Division of Pulmonary, Critical Care, and Sleep Medicine   [id number] [place] [address] [address] [place] [address]  Phone: [phone]  Fax: [phone]    History of Present Illness   Rob Quinonez is a 64 y.o. male with PMH CAD, DLD, GERD, who is presenting as a NPV for fungal lung infection.    On 4/14/25, he was initially was found to have TYLER rounded nodular opacities up to 3.5cm and also some lesions in the LLL measuring 1.2cm with lymphadenopathy. He underwent repeat CT 1 month later 5/2/25 and the TYLER lesion was unchanged but the LLL had enlarging nodules.  he was scheduled for bronchoscopy on 5/2 where he underwent navigation bronchoscopy to the TYLER nodule and EBUS of LN 7. 4L, 11L and 10L. The biopsies revealed necrotic debris, multinucleated giant cells, and necrotizing and non necrotizing granulomas in the TYLER nodule, LN 4L 10L 11L. The fungal cultures also ultimately grew rare fungal yeasts consistent with coccidioides (preliminary finding per lab). He was referred to ID and saw Dr. Barnes who performed additional testing and the coccidioides Ab was positive at 1:8. He was also referred to pulmonary for the non caseating granulomas possibly indicating sarcoidosis    He spent 4 months in [place] from 12/2024 to 4/2025 with family. He developed a cough when he was there and then was found to have the nodules and underwent this workup. He reports his cough is improved now    He works at [employer]. 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        Review of Systems  Review of Systems   Constitutional:  Negative for chills, fever and unexpected weight change.   HENT:  Negative for congestion, drooling, postnasal drip, trouble swallowing and voice change.    Respiratory:  Negative for cough, chest tightness, shortness of breath and wheezing.    Cardiovascular:  Negative for chest  pain, palpitations and leg swelling.   Gastrointestinal:  Negative for abdominal pain, blood in stool, constipation, diarrhea, nausea and vomiting.   Endocrine: Negative for cold intolerance, heat intolerance, polydipsia and polyuria.   Genitourinary:  Negative for difficulty urinating, dysuria, flank pain and hematuria.   Musculoskeletal:  Negative for back pain, joint swelling, neck pain and neck stiffness.   Skin:  Negative for rash and wound.   Neurological:  Negative for dizziness, seizures, syncope, weakness and numbness.   Hematological:  Does not bruise/bleed easily.         Past Medical History   Medical History[1]      Immunizations     Immunization History   Administered Date(s) Administered    Pfizer Purple Cap SARS-CoV-2 07/22/2021, 08/12/2021, 02/12/2022    Pneumococcal conjugate vaccine, 13-valent (PREVNAR 13) 10/30/2019    Pneumococcal polysaccharide vaccine, 23-valent, age 2 years and older (PNEUMOVAX 23) 11/12/2020, 11/20/2020    Tdap vaccine, age 7 year and older (BOOSTRIX, ADACEL) 10/30/2019       Medications and Allergies     Current Outpatient Medications   Medication Instructions    albuterol (ProAir HFA) 90 mcg/actuation inhaler 2 puffs, inhalation, Every 4 hours PRN    aspirin 81 mg EC tablet Take by mouth.    atorvastatin (LIPITOR) 40 mg, oral, Daily    diclofenac sodium (VOLTAREN) 4 g, Topical, 4 times daily, For your knee    famotidine (PEPCID) 40 mg, oral, Daily    ferrous sulfate 325 (65 Fe) mg EC tablet 1 tablet, oral, Nightly, Do not crush, chew, or split. Take with vitamin C (tablets, Orange juice, citrus fruit, etc)    multivit-min-folic acid-vit K (Multi For Him, no iron,) 400-40 mcg capsule Take by mouth.    pantoprazole (PROTONIX) 40 mg, oral, Daily, Do not crush, chew, or split.    sildenafil (VIAGRA) 50 mg, oral, As needed        Allergies:  Patient has no known allergies.    Social History   He reports that he has quit smoking. His smoking use included cigarettes. He has been  exposed to tobacco smoke. He has never used smokeless tobacco. He reports current alcohol use. He reports current drug use. Drug: Marijuana.    He works at [employer]. 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     Family History   Family History[2]      Surgical History   He has a past surgical history that includes Rotator cuff repair (07/21/2017); Colonoscopy w/ polypectomy (07/21/2017); Other surgical history (07/21/2017); and Other surgical history (10/06/2022).    Physical Exam   There were no vitals filed for this visit.      Physical Exam  Constitutional:       Appearance: He is not ill-appearing.   Cardiovascular:      Rate and Rhythm: Normal rate and regular rhythm.      Pulses: Normal pulses.      Heart sounds: Normal heart sounds. No murmur heard.  Pulmonary:      Effort: Pulmonary effort is normal. No respiratory distress.      Breath sounds: Normal breath sounds. No wheezing or rhonchi.   Chest:      Chest wall: No tenderness.   Abdominal:      General: Abdomen is flat. Bowel sounds are normal. There is no distension.      Palpations: Abdomen is soft.      Tenderness: There is no abdominal tenderness. There is no guarding or rebound.   Musculoskeletal:         General: No swelling or tenderness.      Right lower leg: No edema.      Left lower leg: No edema.   Skin:     General: Skin is warm and dry.   Neurological:      General: No focal deficit present.      Mental Status: He is alert and oriented to person, place, and time. Mental status is at baseline.         Results   Pulmonary Function Tests:  12/3/24  FEV1/FVC 70 91%  FEV1 3.19 94%  FVC 4.56 103%  No BD response  %  %  RV/%  DLCO 112% corrected    Chest Radiograph:  XR chest 1 view 05/02/2025    Impression  1. No sizable pneumothorax status post biopsy.  2. Similar patchy left perihilar opacities corresponding with  underlying pulmonary  nodule with increasing surrounding interstitial  opacities, which may represent postprocedural local edema.      Chest CT Scan:  No results found for this or any previous visit from the past 2000 days.       ECHO:  No results found for this or any previous visit from the past 365 days.       Labs:  Lab Results   Component Value Date    WBC 7.0 04/22/2025    HGB 15.3 04/22/2025    HCT 47.7 04/22/2025    MCV 91.9 04/22/2025     04/22/2025       Lab Results   Component Value Date    CREATININE 1.04 04/22/2025    BUN 15 04/22/2025     04/22/2025    K 5.2 04/22/2025     04/22/2025    CO2 28 04/22/2025          IgE: none   Respiratory Allergy Panel: none  AEC:   ABSOLUTE EOSINOPHILS (cells/uL)   Date Value   04/22/2025 152     EOSINOPHILS (%)   Date Value   04/22/2025 2.2       Cultures:  AFB Culture   Date Value Ref Range Status   05/02/2025   Preliminary    Culture in progress and will be examined weekly. A result will be issued either when positive or after 8 weeks incubation.          Susceptibility data from last 90 days.  Collected Specimen Info Organism   05/02/25 Tissue from LUNG BIOPSY LEFT (SPECIFY SITE) Mold   05/02/25 Tissue from LUNG BIOPSY LEFT (SPECIFY SITE) Mixed Microorganisms Resembling Upper Respiratory Carmen         Assessment and Plan   Rob Quinonez is a 64 y.o. male with PMH CAD, DLD, GERD, who is presenting as a NPV for fungal lung infection and possible sarcoidosis    #coccidioides lung infx   #caseating and non caseating granulomas on bronch    -will defer antifungal treatment to Dr. Barnes with ID  -low suspicion for granulomatous disease at this time given granuloma formation is likely in the setting of coccidioides infection  -if pt continues to have symptoms, can work up possible chronic beryllium disease in the future given his occupational exposures    Follow-up:  3 months    Pallavi Sharma, MD         [1]   Past Medical History:  Diagnosis Date    Abnormal findings  on diagnostic imaging of other specified body structures 06/17/2021    Abnormal finding on radiology exam    Abnormal results of function studies of other organs and systems 05/11/2021    Abnormal PET scan of mediastinum    Body mass index (BMI) 26.0-26.9, adult 11/12/2020    Body mass index (BMI) of 26.0 to 26.9 in adult    Body mass index (BMI) 27.0-27.9, adult 01/14/2021    Body mass index (BMI) of 27.0 to 27.9 in adult    Conjunctival hemorrhage, left eye 02/05/2020    Subconjunctival hemorrhage of left eye    Disease of stomach and duodenum, unspecified     Stomach problems    Encounter for screening for infections with a predominantly sexual mode of transmission 10/29/2021    Screening for STD (sexually transmitted disease)    Endocarditis, 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    Leaky heart valve    Family history of glaucoma 08/01/2022    Family history of glaucoma in mother    Gastro-esophageal reflux disease without esophagitis 12/02/2022    Gastro-esophageal reflux disease without esophagitis    Gastro-esophageal reflux disease without esophagitis 11/12/2020    Gastroesophageal reflux disease without esophagitis    Hemorrhage, not elsewhere classified 07/28/2017    Recurrent hemorrhage    Mixed hyperlipidemia 10/29/2021    Mixed hyperlipidemia    Nonrheumatic mitral (valve) insufficiency 11/12/2020    Mild mitral regurgitation by prior echocardiogram    Pain in right hand 12/02/2021    Pain of right hand    Pain, unspecified 11/13/2021    Body aches    Personal history of colonic polyps 01/11/2019    History of colonic polyps    Personal history of other benign neoplasm 10/30/2019    History of other benign neoplasm    Personal history of other diseases of the musculoskeletal system and connective tissue     History of arthritis    Personal history of other drug therapy 01/14/2021    History of pneumococcal vaccination    Personal history of other infectious and parasitic diseases     History of herpes simplex  infection    Stiffness of right hand, not elsewhere classified 11/12/2021    Stiffness of right hand joint    [place]-neck deformity of unspecified finger(s) 12/03/2021    Reagan-neck deformity    Unspecified injury of right wrist, hand and finger(s), initial encounter 03/03/2019    Injury of finger of right hand, initial encounter    Unspecified injury of unspecified wrist, hand and finger(s), sequela 03/15/2019    Finger injury, sequela    Urinary tract infection, site not specified 11/06/2018    Acute lower UTI    Vomiting, unspecified 11/20/2020    Vomiting and diarrhea   [2] No family history on file.

## 2025-05-29 ENCOUNTER — OFFICE VISIT (OUTPATIENT)
Dept: PULMONOLOGY | Facility: HOSPITAL | Age: 65
End: 2025-05-29
Payer: COMMERCIAL

## 2025-05-29 VITALS
SYSTOLIC BLOOD PRESSURE: 117 MMHG | OXYGEN SATURATION: 97 % | HEART RATE: 70 BPM | WEIGHT: 213.8 LBS | TEMPERATURE: 97.7 F | BODY MASS INDEX: 29 KG/M2 | DIASTOLIC BLOOD PRESSURE: 71 MMHG

## 2025-05-29 DIAGNOSIS — B49 FUNGAL INFECTION OF LUNG: ICD-10-CM

## 2025-05-29 DIAGNOSIS — R05.3 CHRONIC COUGH: ICD-10-CM

## 2025-05-29 DIAGNOSIS — L92.9 NON-CASEATING GRANULOMA: ICD-10-CM

## 2025-05-29 PROCEDURE — 99214 OFFICE O/P EST MOD 30 MIN: CPT | Performed by: STUDENT IN AN ORGANIZED HEALTH CARE EDUCATION/TRAINING PROGRAM

## 2025-05-29 RX ORDER — ALBUTEROL SULFATE 0.83 MG/ML
3 SOLUTION RESPIRATORY (INHALATION) ONCE
Status: CANCELLED | OUTPATIENT
Start: 2025-05-29 | End: 2025-05-29

## 2025-05-29 RX ORDER — ALBUTEROL SULFATE 90 UG/1
1 INHALANT RESPIRATORY (INHALATION) ONCE
Status: CANCELLED | OUTPATIENT
Start: 2025-05-29

## 2025-05-29 ASSESSMENT — ENCOUNTER SYMPTOMS
WHEEZING: 0
VOMITING: 0
FEVER: 0
NUMBNESS: 0
ABDOMINAL PAIN: 0
PALPITATIONS: 0
CONSTIPATION: 0
DYSURIA: 0
OCCASIONAL FEELINGS OF UNSTEADINESS: 0
DIFFICULTY URINATING: 0
DIZZINESS: 0
LOSS OF SENSATION IN FEET: 0
NECK PAIN: 0
BACK PAIN: 0
SEIZURES: 0
CHILLS: 0
UNEXPECTED WEIGHT CHANGE: 0
SHORTNESS OF BREATH: 0
WEAKNESS: 0
POLYDIPSIA: 0
TROUBLE SWALLOWING: 0
HEMATURIA: 0
DIARRHEA: 0
FLANK PAIN: 0
JOINT SWELLING: 0
NECK STIFFNESS: 0
WOUND: 0
NAUSEA: 0
VOICE CHANGE: 0
DEPRESSION: 0
BRUISES/BLEEDS EASILY: 0
CHEST TIGHTNESS: 0
BLOOD IN STOOL: 0
COUGH: 0

## 2025-05-29 ASSESSMENT — PAIN SCALES - GENERAL: PAINLEVEL_OUTOF10: 0-NO PAIN

## 2025-05-29 NOTE — PATIENT INSTRUCTIONS
Thank you for visiting the Pulmonary Clinic today. If you have questions or concerns, please call the Pulmonary Department at [phone]. For clinic appointments, please call [phone].     YOUR BREATHING MEDICATIONS:   None    DISCUSSION IN CLINIC:   -Please follow up with your infection disease doctor to get treated for your coccidioides infection  -You had normal lung function so the granulomas in your biopsy are likely just from this infection!  -If you develop any pulmonary symptoms please reach back out to us for more testing    RETURN TO CLINIC: 3 months    Pallavi Sharma, MD  Pulmonary and Critical Care Fellow  Div of Pulmonary, Critical Care and Sleep Medicine

## 2025-05-30 ENCOUNTER — PATIENT MESSAGE (OUTPATIENT)
Dept: PRIMARY CARE | Facility: CLINIC | Age: 65
End: 2025-05-30
Payer: COMMERCIAL

## 2025-06-02 ENCOUNTER — CLINICAL SUPPORT (OUTPATIENT)
Dept: SLEEP MEDICINE | Facility: CLINIC | Age: 65
End: 2025-06-02
Payer: COMMERCIAL

## 2025-06-02 DIAGNOSIS — B38.2: Primary | ICD-10-CM

## 2025-06-02 DIAGNOSIS — R06.83 SNORING: ICD-10-CM

## 2025-06-02 DIAGNOSIS — R40.0 DAYTIME SLEEPINESS: ICD-10-CM

## 2025-06-02 PROCEDURE — 95806 SLEEP STUDY UNATT&RESP EFFT: CPT | Performed by: PSYCHIATRY & NEUROLOGY

## 2025-06-02 RX ORDER — FLUCONAZOLE 200 MG/1
400 TABLET ORAL DAILY
Qty: 180 TABLET | Refills: 3 | Status: SHIPPED | OUTPATIENT
Start: 2025-06-02 | End: 2026-06-02

## 2025-06-02 NOTE — PROGRESS NOTES
Type of Study: HOME SLEEP STUDY - NOMAD     The patient received equipment and instructions for use of the [facility] [facility] Nom[facility] HSAT device. The patient was instructed how to apply the effort belts, cannula, thermistor. It was also explained how the Nomad and oximeter components work.  The patient was asked to record their sleep for an 8-hour period.     The patient was informed of their responsibility for the device and acknowledged this by signing the HSAT device contract. The patient was asked to return the device on 6/3/2025 between the hours of [id number] to the [facility].     The patient was instructed to call 911 as usual for any medical- emergencies while at home.  The patient was also given a phone number for troubleshooting when using the device in case there were additional questions.

## 2025-06-02 NOTE — ASSESSMENT & PLAN NOTE
"Patient eager to \"get mold out of his body\"  Fluconazole 400 mg PO daily x 90 days with 3 refills ordered at pharmacy.  Virtual visit in a month to check tolerance progress.    "

## 2025-06-02 NOTE — PROGRESS NOTES
Fungal culture from  left lung biopsy on 5/2/25 is growing Coccidiodes posadasii   Coccidiodes Abs, CF and ID titer collected 5/23/25 was 1:8 (nl, <1:2)    Histoplasma Ag levels in urine/serum were negative as was serum Cryptococcal Ag     Fluconazole 400 mg PO daily x 90 day supply with 3 refills ordered to [facility]'s pharmacy on file.

## 2025-06-04 LAB
ACID FAST STN SPEC: NORMAL
MYCOBACTERIUM SPEC CULT: NORMAL

## 2025-06-10 DIAGNOSIS — G47.33 OSA (OBSTRUCTIVE SLEEP APNEA): Primary | ICD-10-CM

## 2025-06-11 LAB
ACID FAST STN SPEC: NORMAL
MYCOBACTERIUM SPEC CULT: NORMAL

## 2025-06-18 LAB
ACID FAST STN SPEC: NORMAL
MYCOBACTERIUM SPEC CULT: NORMAL

## 2025-06-24 ENCOUNTER — APPOINTMENT (OUTPATIENT)
Dept: INFECTIOUS DISEASES | Facility: CLINIC | Age: 65
End: 2025-06-24
Payer: COMMERCIAL

## 2025-06-25 LAB
ACID FAST STN SPEC: NORMAL
MYCOBACTERIUM SPEC CULT: NORMAL

## 2025-07-10 ENCOUNTER — APPOINTMENT (OUTPATIENT)
Dept: RADIOLOGY | Facility: HOSPITAL | Age: 65
End: 2025-07-10
Payer: COMMERCIAL

## 2025-07-10 ENCOUNTER — APPOINTMENT (OUTPATIENT)
Dept: SURGERY | Facility: HOSPITAL | Age: 65
End: 2025-07-10
Payer: COMMERCIAL

## 2025-07-15 ENCOUNTER — TELEPHONE (OUTPATIENT)
Dept: PULMONOLOGY | Facility: HOSPITAL | Age: 65
End: 2025-07-15
Payer: COMMERCIAL

## 2025-07-15 NOTE — TELEPHONE ENCOUNTER
LM with new appt time due to change in provider schedule. Provided office number in case date/time does not work.

## 2025-07-22 ENCOUNTER — APPOINTMENT (OUTPATIENT)
Dept: INFECTIOUS DISEASES | Facility: CLINIC | Age: 65
End: 2025-07-22
Payer: COMMERCIAL

## 2025-07-22 VITALS
SYSTOLIC BLOOD PRESSURE: 124 MMHG | DIASTOLIC BLOOD PRESSURE: 77 MMHG | BODY MASS INDEX: 27.5 KG/M2 | HEIGHT: 72 IN | HEART RATE: 56 BPM | WEIGHT: 203 LBS | TEMPERATURE: 96.6 F

## 2025-07-22 DIAGNOSIS — B38.2: Primary | ICD-10-CM

## 2025-07-22 PROCEDURE — 99213 OFFICE O/P EST LOW 20 MIN: CPT | Performed by: INTERNAL MEDICINE

## 2025-07-22 PROCEDURE — 3008F BODY MASS INDEX DOCD: CPT | Performed by: INTERNAL MEDICINE

## 2025-07-22 ASSESSMENT — ENCOUNTER SYMPTOMS
DEPRESSION: 0
LOSS OF SENSATION IN FEET: 0
OCCASIONAL FEELINGS OF UNSTEADINESS: 0

## 2025-07-22 ASSESSMENT — PATIENT HEALTH QUESTIONNAIRE - PHQ9
SUM OF ALL RESPONSES TO PHQ9 QUESTIONS 1 & 2: 0
1. LITTLE INTEREST OR PLEASURE IN DOING THINGS: NOT AT ALL
2. FEELING DOWN, DEPRESSED OR HOPELESS: NOT AT ALL

## 2025-07-22 NOTE — PATIENT INSTRUCTIONS
It was great to see you.  I am happy to hear that your cough is better.  We will check you liver function tests today and if normal, we will not need to check again.    I have ordered a follow-up Coccidiodes antibody test for you to have collected on or around 9/1/25.  We will continue the fluconazole at least until those results are back.  Your CT scan will show up if the lesion has completely resolved.

## 2025-07-22 NOTE — Clinical Note
07/22/25    Vicky Rivers DO  [address]  [address]  [facility] [address]      Dear Dr. Vicky Rivers DO,    I am writing to confirm that your patient, Rob Quinonez, received care in my office on 07/22/25. I have enclosed a summary of the care provided to Rob for your reference.    Please contact me with any questions you may have regarding the visit.    Sincerely,         Lianne Barnes MD  [address] [facility]  [facility] [address]  [facility] [place] [address]    CC: No Recipients

## 2025-07-22 NOTE — PROGRESS NOTES
Infectious Diseases Clinic Follow-up:    Reason for Visit:   Chief Complaint   Patient presents with    Follow-up for treatment of Coccidiomycosis         History of Current Issue  Rob Quinonez is a 64 y.o. year old male last seen on 5/23/25 for consultation for fungal nodules in lung.  Diagnosed by blood testing to have Coccidiomycosis. He requested therapy and thus was initiated on Fluconazole 400mg daily.  He has tolerated the antifungal well he states that his cough has resolved.  He has no respiratory symptoms.  He has had no nausea, vomiting, abdominal pain.  He has had no joint pain.            PAST MEDICAL HISTORY:  Medical History[1]    PAST SURGICAL HISTORY:  Surgical History[2]    ALLERGIES:    Allergies[3]    MEDICATIONS:    Current Medications[4]    REVIEW OF SYSTEMS:    All pertinent positives and negatives as documented in HPI.     PHYSICAL EXAMINATION:       Visit Vitals  /77 (BP Location: Right arm, Patient Position: Sitting)   Pulse 56   Temp 35.9 °C (96.6 °F)   Ht 1.829 m (6')   Wt 92.1 kg (203 lb)   BMI 27.53 kg/m²   Smoking Status Former   BSA 2.16 m²        EXAM:   Constitutional: WD, NAD  Eyes: PERRL, anicteric sclera.    NECK: Supple, no LAD  LUNGS: Breathing unlabored.  Clear in all lung fields.  CVS: RRR, S1 & S2 normal.    ABD: Soft, NT / ND,     DATA:  Laboratory Values and Test Results:    5/23/25: LFTs normal      ASSESSMENT / RECOMMENDATIONS:    64-year-old gentleman without significant past medical history diagnosed with necrotizing and nonnecrotizing granulomatous infiltrates on bronchoscopic biopsy.  Pathology showed fungal elements.  His main symptom was that of cough.  He had a significant history of exposure while in [place] for a prolonged period of time visiting family.  Testing for both histoplasmosis and coccidiomycosis was performed and he was noted to have coccidiomycosis by complement fixation testing.  He has been receiving fluconazole 400 mg daily since  diagnosis made.  His cough has resolved and he is otherwise feeling well.    PLAN:    1.  Will obtain routine labs today including hepatic panel as well as coccidiomycosis testing to assess response to therapy.    2. Follow-up CT imaging is already scheduled before his follow-up with Dr. Castaneda.    3. He will continue 6-12 month course of fluconazole      Lianne Barnes MD  (please reach through [employer])  Infectious Diseases, Senior Attending Physician                          [1]   Past Medical History:  Diagnosis Date    Abnormal findings on diagnostic imaging of other specified body structures 06/17/2021    Abnormal finding on radiology exam    Abnormal results of function studies of other organs and systems 05/11/2021    Abnormal PET scan of mediastinum    Body mass index (BMI) 26.0-26.9, adult 11/12/2020    Body mass index (BMI) of 26.0 to 26.9 in adult    Body mass index (BMI) 27.0-27.9, adult 01/14/2021    Body mass index (BMI) of 27.0 to 27.9 in adult    Conjunctival hemorrhage, left eye 02/05/2020    Subconjunctival hemorrhage of left eye    Disease of stomach and duodenum, unspecified     Stomach problems    Encounter for screening for infections with a predominantly sexual mode of transmission 10/29/2021    Screening for STD (sexually transmitted disease)    Endocarditis, 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    Leaky heart valve    Family history of glaucoma 08/01/2022    Family history of glaucoma in mother    Gastro-esophageal reflux disease without esophagitis 12/02/2022    Gastro-esophageal reflux disease without esophagitis    Gastro-esophageal reflux disease without esophagitis 11/12/2020    Gastroesophageal reflux disease without esophagitis    Hemorrhage, not elsewhere classified 07/28/2017    Recurrent hemorrhage    Mixed hyperlipidemia 10/29/2021    Mixed hyperlipidemia    Nonrheumatic mitral (valve) insufficiency 11/12/2020    Mild mitral regurgitation by prior echocardiogram    Pain in right  hand 12/02/2021    Pain of right hand    Pain, unspecified 11/13/2021    Body aches    Personal history of colonic polyps 01/11/2019    History of colonic polyps    Personal history of other benign neoplasm 10/30/2019    History of other benign neoplasm    Personal history of other diseases of the musculoskeletal system and connective tissue     History of arthritis    Personal history of other drug therapy 01/14/2021    History of pneumococcal vaccination    Personal history of other infectious and parasitic diseases     History of herpes simplex infection    Stiffness of right hand, not elsewhere classified 11/12/2021    Stiffness of right hand joint    [place]-neck deformity of unspecified finger(s) 12/03/2021    [place]-neck deformity    Unspecified injury of right wrist, hand and finger(s), initial encounter 03/03/2019    Injury of finger of right hand, initial encounter    Unspecified injury of unspecified wrist, hand and finger(s), sequela 03/15/2019    Finger injury, sequela    Urinary tract infection, site not specified 11/06/2018    Acute lower UTI    Vomiting, unspecified 11/20/2020    Vomiting and diarrhea   [2]   Past Surgical History:  Procedure Laterality Date    COLONOSCOPY W/ POLYPECTOMY  07/21/2017    Complete Colonoscopy For Polyp Removal    JOINT REPLACEMENT  6/14/2023    OTHER SURGICAL HISTORY  07/21/2017    Knee Arthroscopy With Lysis Of Adhesions    OTHER SURGICAL HISTORY  10/06/2022    Nasal septoplasty    ROTATOR CUFF REPAIR  07/21/2017    Rotator Cuff Repair   [3] No Known Allergies  [4]   Current Outpatient Medications:     atorvastatin (Lipitor) 40 mg tablet, Take 1 tablet (40 mg) by mouth once daily., Disp: 90 tablet, Rfl: 3    famotidine (Pepcid) 40 mg tablet, Take 1 tablet (40 mg) by mouth once daily., Disp: 90 tablet, Rfl: 0    fluconazole (Diflucan) 200 mg tablet, Take 2 tablets (400 mg) by mouth once daily., Disp: 180 tablet, Rfl: 3    multivit-min-folic acid-vit K (Multi For Him, no  iron,) 400-40 mcg capsule, Take by mouth., Disp: , Rfl:     pantoprazole (ProtoNix) 40 mg EC tablet, Take 1 tablet (40 mg) by mouth once daily. Do not crush, chew, or split., Disp: 90 tablet, Rfl: 0    sildenafil (Viagra) 50 mg tablet, Take 1 tablet (50 mg) by mouth if needed for erectile dysfunction., Disp: 30 tablet, Rfl: 3    albuterol (ProAir HFA) 90 mcg/actuation inhaler, Inhale 2 puffs every 4 hours if needed for wheezing or shortness of breath. (Patient not taking: Reported on 7/22/2025), Disp: 8.5 g, Rfl: 0    aspirin 81 mg EC tablet, Take by mouth. (Patient not taking: Reported on 7/22/2025), Disp: , Rfl:     diclofenac sodium (Voltaren) 1 % gel, Apply 4.5 inches (4 g) topically 4 times a day. For your knee (Patient not taking: Reported on 7/22/2025), Disp: 100 g, Rfl: 1

## 2025-07-23 LAB
ALBUMIN SERPL-MCNC: 4.4 G/DL (ref 3.6–5.1)
ALBUMIN/GLOB SERPL: 1.4 (CALC) (ref 1–2.5)
ALP SERPL-CCNC: 127 U/L (ref 35–144)
ALT SERPL-CCNC: 8 U/L (ref 9–46)
AST SERPL-CCNC: 14 U/L (ref 10–35)
BILIRUB DIRECT SERPL-MCNC: 0.1 MG/DL
BILIRUB INDIRECT SERPL-MCNC: 0.3 MG/DL (CALC) (ref 0.2–1.2)
BILIRUB SERPL-MCNC: 0.4 MG/DL (ref 0.2–1.2)
GLOBULIN SER CALC-MCNC: 3.2 G/DL (CALC) (ref 1.9–3.7)
PROT SERPL-MCNC: 7.6 G/DL (ref 6.1–8.1)

## 2025-08-29 ENCOUNTER — APPOINTMENT (OUTPATIENT)
Dept: PULMONOLOGY | Facility: HOSPITAL | Age: 65
End: 2025-08-29
Payer: COMMERCIAL

## 2025-09-04 ENCOUNTER — OFFICE VISIT (OUTPATIENT)
Dept: PULMONOLOGY | Facility: HOSPITAL | Age: 65
End: 2025-09-04
Payer: COMMERCIAL

## 2025-09-04 VITALS
HEART RATE: 60 BPM | OXYGEN SATURATION: 96 % | TEMPERATURE: 97.5 F | SYSTOLIC BLOOD PRESSURE: 129 MMHG | WEIGHT: 202.3 LBS | BODY MASS INDEX: 27.44 KG/M2 | DIASTOLIC BLOOD PRESSURE: 82 MMHG

## 2025-09-04 DIAGNOSIS — B49 FUNGAL INFECTION OF LUNG: ICD-10-CM

## 2025-09-04 DIAGNOSIS — B38.2: Primary | ICD-10-CM

## 2025-09-04 DIAGNOSIS — R91.8 LUNG NODULES: ICD-10-CM

## 2025-09-04 PROCEDURE — 1036F TOBACCO NON-USER: CPT | Performed by: STUDENT IN AN ORGANIZED HEALTH CARE EDUCATION/TRAINING PROGRAM

## 2025-09-04 PROCEDURE — 99212 OFFICE O/P EST SF 10 MIN: CPT

## 2025-09-04 PROCEDURE — 99213 OFFICE O/P EST LOW 20 MIN: CPT | Performed by: STUDENT IN AN ORGANIZED HEALTH CARE EDUCATION/TRAINING PROGRAM

## 2025-09-04 ASSESSMENT — PAIN SCALES - GENERAL: PAINLEVEL_OUTOF10: 0-NO PAIN

## 2025-09-22 ENCOUNTER — APPOINTMENT (OUTPATIENT)
Dept: OPHTHALMOLOGY | Facility: CLINIC | Age: 65
End: 2025-09-22
Payer: COMMERCIAL

## 2025-10-01 ENCOUNTER — APPOINTMENT (OUTPATIENT)
Dept: SLEEP MEDICINE | Facility: CLINIC | Age: 65
End: 2025-10-01
Payer: COMMERCIAL

## 2025-10-14 ENCOUNTER — APPOINTMENT (OUTPATIENT)
Dept: UROLOGY | Facility: CLINIC | Age: 65
End: 2025-10-14
Payer: COMMERCIAL

## 2025-10-29 ENCOUNTER — APPOINTMENT (OUTPATIENT)
Dept: PRIMARY CARE | Facility: CLINIC | Age: 65
End: 2025-10-29
Payer: COMMERCIAL

## 2025-11-04 ENCOUNTER — APPOINTMENT (OUTPATIENT)
Dept: INFECTIOUS DISEASES | Facility: CLINIC | Age: 65
End: 2025-11-04
Payer: COMMERCIAL

## 2026-04-27 ENCOUNTER — APPOINTMENT (OUTPATIENT)
Dept: PRIMARY CARE | Facility: CLINIC | Age: 66
End: 2026-04-27
Payer: COMMERCIAL